# Patient Record
Sex: MALE | Race: WHITE | Employment: OTHER | URBAN - METROPOLITAN AREA
[De-identification: names, ages, dates, MRNs, and addresses within clinical notes are randomized per-mention and may not be internally consistent; named-entity substitution may affect disease eponyms.]

---

## 2018-10-30 ENCOUNTER — HOSPITAL ENCOUNTER (OUTPATIENT)
Dept: SLEEP CENTER | Age: 58
Discharge: HOME | End: 2018-10-30
Payer: COMMERCIAL

## 2018-10-30 DIAGNOSIS — G47.10 HYPERSOMNIA, PERSISTENT: ICD-10-CM

## 2018-10-30 DIAGNOSIS — E66.9 CLASS 2 OBESITY WITH BODY MASS INDEX (BMI) OF 37.0 TO 37.9 IN ADULT, UNSPECIFIED OBESITY TYPE, UNSPECIFIED WHETHER SERIOUS COMORBIDITY PRESENT: Primary | ICD-10-CM

## 2018-10-30 DIAGNOSIS — G47.10 HYPERSOMNOLENCE: ICD-10-CM

## 2018-10-30 DIAGNOSIS — D86.0 SARCOIDOSIS OF LUNG (HCC): ICD-10-CM

## 2018-10-30 DIAGNOSIS — G47.33 OSA TREATED WITH BIPAP: Chronic | ICD-10-CM

## 2018-10-30 PROCEDURE — 95805 MULTIPLE SLEEP LATENCY TEST: CPT

## 2018-10-30 PROCEDURE — 95810 POLYSOM 6/> YRS 4/> PARAM: CPT

## 2018-10-30 ASSESSMENT — SLEEP AND FATIGUE QUESTIONNAIRES
HOW LIKELY ARE YOU TO NOD OFF OR FALL ASLEEP WHILE SITTING AND READING: 1
HOW LIKELY ARE YOU TO NOD OFF OR FALL ASLEEP WHILE SITTING QUIETLY AFTER LUNCH WITHOUT ALCOHOL: 0
HOW LIKELY ARE YOU TO NOD OFF OR FALL ASLEEP WHILE SITTING AND TALKING TO SOMEONE: 0
HOW LIKELY ARE YOU TO NOD OFF OR FALL ASLEEP IN A CAR, WHILE STOPPED FOR A FEW MINUTES IN TRAFFIC: 0
HOW LIKELY ARE YOU TO NOD OFF OR FALL ASLEEP WHEN YOU ARE A PASSENGER IN A CAR FOR AN HOUR WITHOUT A BREAK: 1
ESS TOTAL SCORE: 7
HOW LIKELY ARE YOU TO NOD OFF OR FALL ASLEEP WHILE LYING DOWN TO REST IN THE AFTERNOON WHEN CIRCUMSTANCES PERMIT: 2
HOW LIKELY ARE YOU TO NOD OFF OR FALL ASLEEP WHILE SITTING INACTIVE IN A PUBLIC PLACE: 1
HOW LIKELY ARE YOU TO NOD OFF OR FALL ASLEEP WHILE WATCHING TV: 2

## 2018-10-31 VITALS
SYSTOLIC BLOOD PRESSURE: 140 MMHG | HEART RATE: 75 BPM | WEIGHT: 260 LBS | BODY MASS INDEX: 35.21 KG/M2 | OXYGEN SATURATION: 97 % | HEIGHT: 72 IN | DIASTOLIC BLOOD PRESSURE: 80 MMHG

## 2018-11-05 NOTE — PROGRESS NOTES
[address] [address]                               SLEEP STUDY REPORT    PATIENT NAME: Nicole Patient                       :        1960  MED REC NO:   [id number]                            ROOM:  ACCOUNT NO:   [de-identified]                           ADMIT DATE: 10/30/2018  PROVIDER:     Ayad Robert MD    DATE OF STUDY:  10/30/2018    REFERRING PROVIDER:  Desiree Carrero M.D.    STUDY PERFORMED:  Polysomnography. INDICATION FOR POLYSOMNOGRAPHY:  Study to be done prior to a multiple  sleep latency test.    CURRENT MEDICATIONS:  Rosuvastatin, thyroxine, metformin, vitamins, and  Pepcid. INTERPRETATION:  SLEEP ARCHITECTURE:  This patient had a total time in bed of 451  minutes. Total sleep time was 395 minutes. Sleep efficiency was 88%. Sleep latency was 13 minutes. REM latency was 57 minutes. SLEEP STAGING:  The patient was awake 12% of the time in bed. Stage N1  was 11% and N2 was 49% of the total sleep time. Slow wave sleep was 18%  of the sleep time and end-stage REM sleep was 23% of the sleep time. RESPIRATION SUMMARY:  APNEA:  There were 21 apneic events, 11 central, seven obstructive, and  three mixed. Maximum duration was 27 seconds. Twelve events occurred  during REM stage sleep. HYPOPNEA:  There were four hypopneic events, two occurred during REM  stage sleep. Maximum duration was 36 seconds. APNEA/HYPOPNEA INDEX:  The apnea/hypopnea index was four. Twenty five  events occurred in the supine position. TITRATION OF CPAP:  This patient had a CPAP of seven for the entire  study. There was no titration done. AROUSAL ANALYSIS:  During the study, there were 44 arousals/awakenings,  the sleep disruption index was normal.    LIMB MOVEMENT SUMMARY:  There were 50 limb movements, the limb movement  index was normal.    OXYGEN SATURATION:  Baseline oxygen saturation while awake was 95%.

## 2021-10-05 ENCOUNTER — HOSPITAL ENCOUNTER (OUTPATIENT)
Dept: CARDIOLOGY | Age: 61
Discharge: HOME | End: 2021-10-05
Payer: COMMERCIAL

## 2021-10-05 VITALS
WEIGHT: 308 LBS | HEIGHT: 72 IN | BODY MASS INDEX: 41.72 KG/M2 | DIASTOLIC BLOOD PRESSURE: 70 MMHG | RESPIRATION RATE: 12 BRPM | SYSTOLIC BLOOD PRESSURE: 116 MMHG | HEART RATE: 66 BPM

## 2021-10-05 DIAGNOSIS — R06.02 SHORTNESS OF BREATH: ICD-10-CM

## 2021-10-05 PROCEDURE — 93017 CV STRESS TEST TRACING ONLY: CPT

## 2021-10-05 NOTE — PROCEDURES
[address] [facility] [address]  [phone]    Exercise Stress Study (non-nuclear)      Name: [facility] Account Number:  [de-identified]      :  1960          Sex: male         Date of Study:  10/5/2021    Height: 6' (182.9 cm)          Weight: (!) 308 lb (139.7 kg)    Ordering Provider: Dinah Peabody, MD, Reena Peterson MD          PCP: Cecilia Gastelum MD    Cardiologist: None              Interpreting Physician: Jose Perez MD    Indication:   Detecting the presence and location of coronary artery disease    Clinical History:   Patient has no known history of coronary artery disease. Resting ECG:    NSR 69 bpm. Normal axis/intervals. No ST/T changes. Exercise: The patient exercised using a Jose protocol, completing 5:00 minutes and reaching an estimated work load of 7.0 metabolic equivalents (METS). Resting HR was 69. Peak exercise heart rate was 150 ( 94% of maximum predicted heart rate for age). Baseline /70. Peak exercise /70. The blood pressure response to exercise was normal      Exercise was terminated due to dyspnea. The patient experienced non-cardiac chest pain at rest, no change with exercise. Pulse oximetry was used to monitor oxygen saturation during the stress test.  The study was performed on Room Air. The resting pulse oximeter was 96%. The lowest O2 saturation seen during exercise was 92 %. The average O2 saturation with exercise was 93 %. Exercise ECG:   The patient demonstrated no arrhythmias during exercise. With exercise up to 1.4 mm of horizontal and downsloping ST depression was noted in leads II, III and AVF with initial changes beginning at 2 minutes 48 seconds into exercise, at a heart rate of 128. These changes these changes persisted 2 minutes into recovery period.     Herrera treadmill score was -6 implying intermediate

## 2021-10-06 ENCOUNTER — TELEPHONE (OUTPATIENT)
Dept: CARDIOLOGY CLINIC | Age: 61
End: 2021-10-06

## 2021-10-06 NOTE — TELEPHONE ENCOUNTER
Dr. Sarah Cervantes called to discuss patient's abnormal stress with Dr. Arlette Ramirez.  Stress test was ordered by Dr. Sarah Cervantes, completed by Dr. Hermelindo Metcalf, PCP is Dr. Richie Lizarraga. Spoke with Alphonse Gutiérrez in Dr. Evangelina Calle office, referral has already been placed to [facility].

## 2021-10-13 ENCOUNTER — OFFICE VISIT (OUTPATIENT)
Dept: CARDIOLOGY CLINIC | Age: 61
End: 2021-10-13
Payer: COMMERCIAL

## 2021-10-13 VITALS
HEIGHT: 72 IN | HEART RATE: 75 BPM | BODY MASS INDEX: 41.11 KG/M2 | RESPIRATION RATE: 16 BRPM | DIASTOLIC BLOOD PRESSURE: 78 MMHG | SYSTOLIC BLOOD PRESSURE: 138 MMHG | WEIGHT: 303.5 LBS

## 2021-10-13 DIAGNOSIS — R07.2 PRECORDIAL PAIN: ICD-10-CM

## 2021-10-13 DIAGNOSIS — R94.39 ABNORMAL STRESS TEST: Primary | ICD-10-CM

## 2021-10-13 DIAGNOSIS — R06.02 SHORTNESS OF BREATH: ICD-10-CM

## 2021-10-13 PROCEDURE — 93000 ELECTROCARDIOGRAM COMPLETE: CPT | Performed by: INTERNAL MEDICINE

## 2021-10-13 PROCEDURE — 99204 OFFICE O/P NEW MOD 45 MIN: CPT | Performed by: INTERNAL MEDICINE

## 2021-10-13 NOTE — PROGRESS NOTES
Patient Active Problem List   Diagnosis    SANJAY treated with BiPAP    Sarcoidosis of lung (HCC)    SOB (shortness of breath)       Current Outpatient Medications   Medication Sig Dispense Refill    Famotidine (PEPCID PO) Take 1 tablet by mouth daily      Ascorbic Acid (VITAMIN C) 1000 MG tablet Take 1,000 mg by mouth daily       valACYclovir (VALTREX) 1 g tablet Take 1 g by mouth as needed      Cholecalciferol (VITAMIN D) 2000 units CAPS capsule Take 1 capsule by mouth daily       aspirin 81 MG tablet Take 81 mg by mouth daily      rosuvastatin (CRESTOR) 5 MG tablet 5 mg daily       levothyroxine (SYNTHROID) 150 MCG tablet Take 150 mcg by mouth Daily      metFORMIN (GLUCOPHAGE) 1000 MG tablet Take 1,000 mg by mouth 2 times daily (with meals)       No current facility-administered medications for this visit. CC:    Patient is seen in Initial Evaluation for:  1. Abnormal stress test    2. Shortness of breath    3. Precordial pain        HPI:  Patient is seen in evaluation after abnormal stress test.  Patient with evidence for ischemia by EKG criteria. Patient relates onset of chest pain and shortness of breath over the past month. Describes this as a chest tightness. Relates this is becoming more significant over the past month. Notes on recent trip to [facility] that he had to stop walking frequently to to the above symptoms.     ROS:   General: No unusual weight gain, (+) change in exercise tolerance  Skin: No rash or itching  EENT: No vision changes or nosebleeds  Cardiovascular: No orthopnea or paroxysmal nocturnal dyspnea  Respiratory: No cough or hemoptysis  Gastrointestinal: No hematemesis or recent changes in bowel habits  Genitourinary: No hematuria, urgency or frequency  Musculoskeletal: No muscular weakness or joint swelling   Neurologic / Psychiatric: No incoordination or convulsions  Allergic / Immunologic/ Lymphatic / Endocrine: No anemia or bleeding tendency    Social History Socioeconomic History    Marital status:      Spouse name: Not on file    Number of children: Not on file    Years of education: Not on file    Highest education level: Not on file   Occupational History    Occupation:  self employed   Tobacco Use    Smoking status: Never Smoker    Smokeless tobacco: Never Used   Vaping Use    Vaping Use: Never used   Substance and Sexual Activity    Alcohol use: Yes     Alcohol/week: 0.0 standard drinks     Comment: socially    Drug use: No    Sexual activity: Not Currently     Partners: Female   Other Topics Concern    Not on file   Social History Narrative    Not on file     Social Determinants of Health     Financial Resource Strain:     Difficulty of Paying Living Expenses:    Food Insecurity:     Worried About Running Out of Food in the Last Year:     [address] in the Last Year:    Transportation Needs:     Lack of Transportation (Medical):      Lack of Transportation (Non-Medical):    Physical Activity:     Days of Exercise per Week:     Minutes of Exercise per Session:    Stress:     Feeling of Stress :    Social Connections:     Frequency of Communication with Friends and Family:     Frequency of Social Gatherings with Friends and Family:     Attends [employer] Services:     Active Member of Clubs or Organizations:     Attends Club or Organization Meetings:     Marital Status:    Intimate Partner Violence:     Fear of Current or Ex-Partner:     Emotionally Abused:     Physically Abused:     Sexually Abused:        Family History   Problem Relation Age of Onset    Cancer Mother         lung    Cancer Father         colon    Cancer Maternal Grandmother         lung       Past Medical History:   Diagnosis Date    Hyperlipidemia     Hyperthyroidism     Sarcoidosis     Unspecified sleep apnea        PHYSICAL EXAM:  CONSTITUTIONAL:  Well developed, well nourished    Vitals:    10/13/21 1427 10/13/21 1436   BP: (!) 144/80 138/78   Pulse: 75    Resp: 16    Weight: (!) 303 lb 8 oz (137.7 kg)    Height: 6' (1.829 m)      HEAD & FACE: Normocephalic. Symmetric. EYES: No xanthelasma. Conjunctivae not injected. EARS, NOSE, MOUTH & THROAT: Good dentition. No oral pallor or cyanosis. NECK: No JVD at 30 degrees. No thyromegaly. RESPIRATORY: Clear to auscultation and percussion in all fields. No use of accessory muscle or intercostal retractions. CARDIOVASCULAR: Regular rate and rhythm. No lifts or thrills on palpitation. Auscultation with normal S1-S2 in intensity and splitting. No carotid bruits. Abdominal aorta not enlarged. Femoral arteries without bruits. Pedal pulses 2+. No edema. ABDOMEN: Soft without hepatic or splenic enlargement. No tenderness. MUSCULOSKELETAL: No kyphosis or scoliosis of the back. Good muscle strength and tone. No muscle atrophy. Normal gait and ability to undergo exercise stress testing. EXTREMITIES: No clubbing or cyanosis. SKIN: No Xanthomas or ulcerations. NEUROLOGIC: Oriented to time, place and person. Normal mood and affect. LYMPHATIC:  No palpable neck or supraclavicular nodes. No splenomegaly. EKG: the EKG tracing was reviewed and found to reveal: Normal sinus rhythm.  ms. ASSESSMENT:                                                     ORDERS:       Diagnosis Orders   1. Abnormal stress test  EKG 12 lead    Left heart cath   2. Shortness of breath     3. Precordial pain       Recent onset of shortness of breath and chest pain with exertion/abnormal stress test.    PLAN:   See above orders. Medication reconciliation completed. Old records were reviewed and found to reveal: LDL 93  Had discussion with patient and wife regarding stress test implications and findings. After thorough discussion joint decision to proceed with cardiac catheterization possible PCI. Recent procedures discussed in detail.   Discussed issues that would prompt earlier evaluation/ER. Same cardiac medications. Follow-up office visit in 1 months.

## 2021-10-14 DIAGNOSIS — R06.02 SOB (SHORTNESS OF BREATH): ICD-10-CM

## 2021-10-14 DIAGNOSIS — Z01.810 PREOPERATIVE CARDIOVASCULAR EXAMINATION: Primary | ICD-10-CM

## 2021-10-14 DIAGNOSIS — R94.39 ABNORMAL STRESS TEST: ICD-10-CM

## 2021-10-26 ENCOUNTER — HOSPITAL ENCOUNTER (OUTPATIENT)
Age: 61
Discharge: HOME | End: 2021-10-26
Payer: COMMERCIAL

## 2021-10-26 ENCOUNTER — HOSPITAL ENCOUNTER (OUTPATIENT)
Dept: GENERAL RADIOLOGY | Age: 61
Discharge: HOME | End: 2021-10-28
Payer: COMMERCIAL

## 2021-10-26 ENCOUNTER — HOSPITAL ENCOUNTER (OUTPATIENT)
Age: 61
Discharge: HOME | End: 2021-10-28
Payer: COMMERCIAL

## 2021-10-26 DIAGNOSIS — R94.39 ABNORMAL STRESS TEST: ICD-10-CM

## 2021-10-26 DIAGNOSIS — R06.02 SOB (SHORTNESS OF BREATH): ICD-10-CM

## 2021-10-26 DIAGNOSIS — R06.02 SHORTNESS OF BREATH: ICD-10-CM

## 2021-10-26 DIAGNOSIS — Z01.810 PREOPERATIVE CARDIOVASCULAR EXAMINATION: ICD-10-CM

## 2021-10-26 LAB
ALBUMIN SERPL-MCNC: 4 G/DL (ref 3.5–5.2)
ALP BLD-CCNC: 85 U/L (ref 40–129)
ALT SERPL-CCNC: 25 U/L (ref 0–40)
ANION GAP SERPL CALCULATED.3IONS-SCNC: 10 MMOL/L (ref 7–16)
AST SERPL-CCNC: 20 U/L (ref 0–39)
BILIRUB SERPL-MCNC: 0.4 MG/DL (ref 0–1.2)
BUN BLDV-MCNC: 15 MG/DL (ref 6–23)
CALCIUM SERPL-MCNC: 9.3 MG/DL (ref 8.6–10.2)
CHLORIDE BLD-SCNC: 105 MMOL/L (ref 98–107)
CO2: 23 MMOL/L (ref 22–29)
CREAT SERPL-MCNC: 0.9 MG/DL (ref 0.7–1.2)
GFR AFRICAN AMERICAN: >60
GFR NON-AFRICAN AMERICAN: >60 ML/MIN/1.73
GLUCOSE BLD-MCNC: 82 MG/DL (ref 74–99)
HCT VFR BLD CALC: 41.8 % (ref 37–54)
HEMOGLOBIN: 13.7 G/DL (ref 12.5–16.5)
INR BLD: 0.9
MCH RBC QN AUTO: 28.3 PG (ref 26–35)
MCHC RBC AUTO-ENTMCNC: 32.8 % (ref 32–34.5)
MCV RBC AUTO: 86.4 FL (ref 80–99.9)
PDW BLD-RTO: 13.7 FL (ref 11.5–15)
PLATELET # BLD: 268 E9/L (ref 130–450)
PMV BLD AUTO: 9.7 FL (ref 7–12)
POTASSIUM SERPL-SCNC: 4.1 MMOL/L (ref 3.5–5)
PROTHROMBIN TIME: 11 SEC (ref 9.3–12.4)
RBC # BLD: 4.84 E12/L (ref 3.8–5.8)
SODIUM BLD-SCNC: 138 MMOL/L (ref 132–146)
TOTAL PROTEIN: 6.9 G/DL (ref 6.4–8.3)
WBC # BLD: 6.7 E9/L (ref 4.5–11.5)

## 2021-10-26 PROCEDURE — 71046 X-RAY EXAM CHEST 2 VIEWS: CPT

## 2021-10-26 PROCEDURE — 80053 COMPREHEN METABOLIC PANEL: CPT

## 2021-10-26 PROCEDURE — 36415 COLL VENOUS BLD VENIPUNCTURE: CPT

## 2021-10-26 PROCEDURE — 85610 PROTHROMBIN TIME: CPT

## 2021-10-26 PROCEDURE — 85027 COMPLETE CBC AUTOMATED: CPT

## 2021-10-27 ENCOUNTER — TELEPHONE (OUTPATIENT)
Dept: CARDIAC CATH/INVASIVE PROCEDURES | Age: 61
End: 2021-10-27

## 2021-10-27 NOTE — TELEPHONE ENCOUNTER
Reminded patient of scheduled procedure on 10/28/21. Instructions given and COVID questionnaire completed.

## 2021-10-28 ENCOUNTER — HOSPITAL ENCOUNTER (OUTPATIENT)
Dept: CARDIAC CATH/INVASIVE PROCEDURES | Age: 61
Discharge: HOME | End: 2021-10-28
Attending: INTERNAL MEDICINE | Admitting: INTERNAL MEDICINE
Payer: COMMERCIAL

## 2021-10-28 VITALS
TEMPERATURE: 97.9 F | SYSTOLIC BLOOD PRESSURE: 111 MMHG | HEIGHT: 72 IN | RESPIRATION RATE: 16 BRPM | WEIGHT: 295 LBS | DIASTOLIC BLOOD PRESSURE: 57 MMHG | OXYGEN SATURATION: 97 % | HEART RATE: 82 BPM | BODY MASS INDEX: 39.96 KG/M2

## 2021-10-28 DIAGNOSIS — I25.10 CAD IN NATIVE ARTERY: ICD-10-CM

## 2021-10-28 LAB
ABO/RH: NORMAL
ANTIBODY SCREEN: NORMAL

## 2021-10-28 PROCEDURE — 2709999900 HC NON-CHARGEABLE SUPPLY

## 2021-10-28 PROCEDURE — 86850 RBC ANTIBODY SCREEN: CPT

## 2021-10-28 PROCEDURE — 36415 COLL VENOUS BLD VENIPUNCTURE: CPT

## 2021-10-28 PROCEDURE — 86901 BLOOD TYPING SEROLOGIC RH(D): CPT

## 2021-10-28 PROCEDURE — 93458 L HRT ARTERY/VENTRICLE ANGIO: CPT | Performed by: INTERNAL MEDICINE

## 2021-10-28 PROCEDURE — 2580000003 HC RX 258: Performed by: INTERNAL MEDICINE

## 2021-10-28 PROCEDURE — C1894 INTRO/SHEATH, NON-LASER: HCPCS

## 2021-10-28 PROCEDURE — 2500000003 HC RX 250 WO HCPCS

## 2021-10-28 PROCEDURE — 6360000002 HC RX W HCPCS

## 2021-10-28 PROCEDURE — C1769 GUIDE WIRE: HCPCS

## 2021-10-28 PROCEDURE — 86900 BLOOD TYPING SEROLOGIC ABO: CPT

## 2021-10-28 RX ORDER — SODIUM CHLORIDE 9 MG/ML
25 INJECTION, SOLUTION INTRAVENOUS PRN
Status: DISCONTINUED | OUTPATIENT
Start: 2021-10-28 | End: 2021-10-28 | Stop reason: HOSPADM

## 2021-10-28 RX ORDER — SODIUM CHLORIDE 9 MG/ML
INJECTION, SOLUTION INTRAVENOUS CONTINUOUS
Status: DISCONTINUED | OUTPATIENT
Start: 2021-10-28 | End: 2021-10-28 | Stop reason: HOSPADM

## 2021-10-28 RX ORDER — SODIUM CHLORIDE 0.9 % (FLUSH) 0.9 %
5-40 SYRINGE (ML) INJECTION PRN
Status: DISCONTINUED | OUTPATIENT
Start: 2021-10-28 | End: 2021-10-28 | Stop reason: HOSPADM

## 2021-10-28 RX ORDER — SODIUM CHLORIDE 0.9 % (FLUSH) 0.9 %
5-40 SYRINGE (ML) INJECTION EVERY 12 HOURS SCHEDULED
Status: DISCONTINUED | OUTPATIENT
Start: 2021-10-28 | End: 2021-10-28 | Stop reason: HOSPADM

## 2021-10-28 RX ADMIN — SODIUM CHLORIDE: 9 INJECTION, SOLUTION INTRAVENOUS at 09:10

## 2021-10-28 NOTE — PROGRESS NOTES
Right radial puncture site soft and no bleeding or hematoma. Denies pain.    Extended Stay Recovery Discharge Documentation

## 2021-10-28 NOTE — PROCEDURES
[address]                  [address] [place] [facility]                            CARDIAC CATHETERIZATION    PATIENT NAME: Kerri Nagy                       :        1960  MED REC NO:   [id number]                            ROOM:       6314  ACCOUNT NO:   [de-identified]                           ADMIT DATE: 10/28/2021  PROVIDER:     Mine Mcdonough MD    DATE OF PROCEDURE:  10/28/2021    PROCEDURE:  Left heart catheterization, selective coronary angiography,  and left ventriculography. The procedure was done through right radial approach using ultrasound  guidance. The patient received intravenous Versed and intravenous fentanyl for  sedation. INDICATION:  Chest discomfort, exertional dyspnea, and an abnormal  stress test.    PRESSURES:  Aorta 97/66 with a mean of 77. Left ventricular systolic pressure 699, left ventricular end-diastolic  pressure is 7. There was no gradient across the aortic valve. CORONARY ANGIOGRAPHY:  Left main:  The left main artery did not appear to have any angiographic  disease. LAD:  The left anterior descending artery had a small around 30% tubular  narrowing in the proximal/mid vessel with mild myocardial bridging at  the site with the rest of the vessel showing no significant angiographic  disease. LCX:  The left circumflex is a large vessel which did not appear to have  any angiographic disease. RCA:  The right coronary artery is a dominant vessel which did not  appear to have any angiographic disease. Of note, the coronary arteries were excessively tortuous. LEFT VENTRICULOGRAPHY:  The left ventricle is normal in size and  contractility with an estimated ejection fraction of 65%. There was no  mitral regurgitation noted. The right radial arterial sheath was removed at the end of the  procedure, and a TR band was applied with adequate hemostasis and with  preservation of pulse.     The patient tolerated the procedure well and left the cardiac  catheterization laboratory in stable condition. CONCLUSIONS:  1. Coronary artery disease. a. Left main, no angiographic disease. b.  LAD, large, tortuous vessel with smooth 30% mid vessel tubular  narrowing with mild myocardial bridging at the site. c.  LCX, large, tortuous vessel with no angiographic disease. d.  RCA, dominant, tortuous vessel with no angiographic disease. 2.  Normal ventricular size and systolic function with an estimated  ejection fraction of 65%. 3.  Normal left ventricular end-diastolic pressure.         Warren Ruvalcaba MD    D: 10/28/2021 9:00:00       T: 10/28/2021 9:02:24     [email or web address]  Job#: [id number]     Doc#: [id number]    CC:

## 2021-10-28 NOTE — PROGRESS NOTES
Extended Stay Recovery Discharge Documentation    Final procedure site check completed, stable for discharge- Yes  Ambulated without issue post recovery completion, gait steady. Peripheral IV sites removed (see IV Flowsheet) and site asymptomatic- Yes  Patient dressed self without assistance. Discharge instructions reviewed with Patient and verbalized understanding. Patient discharged Home with spouse at 1:30 PM  Monitor cleaned and placed back in nurses' station- Yes    Pt left glasses in bedding upon discharge.  Called pt and he said to throw them away because they are \"just cheap cheaters\"

## 2021-10-28 NOTE — OP NOTE
Operative Note      Patient: Gordon Rust  YOB: 1960  MRN: [id number]    Date of Procedure: 10/28/21    Indication:  1. CP. Abnormal stress test  2. AUC score: 9  3. AUC indication: 17    Procedure: Left Heart Catheterization, coronary angiography, left ventriculography    Anesthesia: Versed, Fentanyl  Time sedation was administered: 08:23. I was present in the room when sedation was administered. Procedure end time: 08:37  Time spent with face to face monitoring of moderate sedation: 23 minutes    LHC performed via right radial approach using a 6 F sheath. 2.5mg of diluted Verapamil and 200mcg of nitroglycerine administered through the sheath. 5000 U heparin administered IV. Findings:  Left main: 0%  stenosis  LAD: 20 %  stenosis  Circumflex: 0 %   stenosis  RCA: Dominant. 0 %  stenosis  LV angio: 65-70%  ejection fraction    Hemodynamics:  LV: 105 mmHg. EDP: 7 No gradient across AV. Ao: 97/66 ( 77 ). Sheath removed and TR band applied. There was good hemostasis achieved and the distal pulses were intact.      Complication: None   Estimated blood loss: 10 cc  Contrast use: 80 cc    Post op diagnosis:  Patent coronaries  Normal LV function    PLAN:  Medical therapy / risk factor modification      Electronically signed by Arturo Christian MD on 10/28/2021 at 8:50 AM

## 2021-11-17 ENCOUNTER — OFFICE VISIT (OUTPATIENT)
Dept: CARDIOLOGY CLINIC | Age: 61
End: 2021-11-17
Payer: COMMERCIAL

## 2021-11-17 VITALS
DIASTOLIC BLOOD PRESSURE: 64 MMHG | HEIGHT: 72 IN | HEART RATE: 93 BPM | SYSTOLIC BLOOD PRESSURE: 128 MMHG | WEIGHT: 304.6 LBS | BODY MASS INDEX: 41.26 KG/M2 | OXYGEN SATURATION: 97 % | RESPIRATION RATE: 16 BRPM

## 2021-11-17 DIAGNOSIS — I25.10 CAD IN NATIVE ARTERY: Primary | ICD-10-CM

## 2021-11-17 DIAGNOSIS — E78.00 HYPERCHOLESTEROLEMIA: ICD-10-CM

## 2021-11-17 DIAGNOSIS — R06.09 DYSPNEA ON EXERTION: ICD-10-CM

## 2021-11-17 PROCEDURE — 93000 ELECTROCARDIOGRAM COMPLETE: CPT | Performed by: INTERNAL MEDICINE

## 2021-11-17 PROCEDURE — 99214 OFFICE O/P EST MOD 30 MIN: CPT | Performed by: INTERNAL MEDICINE

## 2021-11-17 RX ORDER — ROSUVASTATIN CALCIUM 10 MG/1
10 TABLET, COATED ORAL DAILY
Qty: 90 TABLET | Refills: 3 | Status: ON HOLD
Start: 2021-11-17 | End: 2022-10-19 | Stop reason: HOSPADM

## 2021-11-17 RX ORDER — ROSUVASTATIN CALCIUM 10 MG/1
10 TABLET, COATED ORAL DAILY
COMMUNITY
End: 2021-11-17 | Stop reason: SDUPTHER

## 2021-11-17 NOTE — PROGRESS NOTES
Patient Active Problem List   Diagnosis    SANJAY treated with BiPAP    Sarcoidosis of lung ([facility] [address]    SOB (shortness of breath)    CAD in native artery       Current Outpatient Medications   Medication Sig Dispense Refill    rosuvastatin (CRESTOR) 10 MG tablet Take 1 tablet by mouth daily 90 tablet 3    Famotidine (PEPCID PO) Take 1 tablet by mouth daily      Ascorbic Acid (VITAMIN C) 1000 MG tablet Take 1,000 mg by mouth daily       valACYclovir (VALTREX) 1 g tablet Take 1 g by mouth as needed      Cholecalciferol (VITAMIN D) 2000 units CAPS capsule Take 1 capsule by mouth daily       aspirin 81 MG tablet Take 81 mg by mouth daily      levothyroxine (SYNTHROID) 150 MCG tablet Take 150 mcg by mouth Daily      metFORMIN (GLUCOPHAGE) 1000 MG tablet Take 1,000 mg by mouth 2 times daily (with meals)       No current facility-administered medications for this visit. CC:    Patient is seen in follow up for:  1. CAD in native artery - mild    2. Dyspnea on exertion    3. Hypercholesterolemia        HPI:  Patient is seen in follow-up after recent heart catheterization. He was noted to have mild disease of the LAD but no significant stenosis. Additionally he is seen in follow-up for his shortness of breath with exertion. He relates that he was diagnosed with COVID-19 at the time of his initial evaluation. Subsequently his shortness of breath has been resolving.     ROS:   General: No unusual weight gain, no change in exercise tolerance  Skin: No rash or itching  EENT: No vision changes or nosebleeds  Cardiovascular: No orthopnea or paroxysmal nocturnal dyspnea  Respiratory: No cough or hemoptysis  Gastrointestinal: No hematemesis or recent changes in bowel habits  Genitourinary: No hematuria, urgency or frequency  Musculoskeletal: No muscular weakness or joint swelling   Neurologic / Psychiatric: No incoordination or convulsions  Allergic / Immunologic/ Lymphatic / Endocrine: No anemia or bleeding tendency    Social History     Socioeconomic History    Marital status:      Spouse name: Not on file    Number of children: Not on file    Years of education: Not on file    Highest education level: Not on file   Occupational History    Occupation:  self employed   Tobacco Use    Smoking status: Never Smoker    Smokeless tobacco: Never Used   Vaping Use    Vaping Use: Never used   Substance and Sexual Activity    Alcohol use: Yes     Alcohol/week: 0.0 standard drinks     Comment: socially    Drug use: No    Sexual activity: Not Currently     Partners: Female   Other Topics Concern    Not on file   Social History Narrative    Not on file     Social Determinants of Health     Financial Resource Strain:     Difficulty of Paying Living Expenses: Not on file   Food Insecurity:     Worried About Running Out of Food in the Last Year: Not on file    Thom of Food in the Last Year: Not on file   Transportation Needs:     Lack of Transportation (Medical): Not on file    Lack of Transportation (Non-Medical):  Not on file   Physical Activity:     Days of Exercise per Week: Not on file    Minutes of Exercise per Session: Not on file   Stress:     Feeling of Stress : Not on file   Social Connections:     Frequency of Communication with Friends and Family: Not on file    Frequency of Social Gatherings with Friends and Family: Not on file    Attends [employer]ism Services: Not on file    Active Member of [employer] or Organizations: Not on file    Attends Club or Organization Meetings: Not on file    Marital Status: Not on file   Intimate Partner Violence:     Fear of Current or Ex-Partner: Not on file    Emotionally Abused: Not on file    Physically Abused: Not on file    Sexually Abused: Not on file   Housing Stability:     Unable to Pay for Housing in the Last Year: Not on file    Number of [place] in the Last Year: Not on file    Unstable Housing in the Last Year: Not on file stable. PLAN:   See above orders. Medication reconciliation completed. Old records were reviewed and found to reveal: LDL 93 on 9/20/2021  Discussed issues that would prompt earlier evaluation. Increase Crestor to 10 mg daily. Follow-up office visit in 1 year.

## 2022-03-13 ENCOUNTER — APPOINTMENT (OUTPATIENT)
Dept: GENERAL RADIOLOGY | Age: 62
End: 2022-03-13
Payer: COMMERCIAL

## 2022-03-13 ENCOUNTER — HOSPITAL ENCOUNTER (EMERGENCY)
Age: 62
Discharge: HOME | End: 2022-03-13
Payer: COMMERCIAL

## 2022-03-13 VITALS
WEIGHT: 304 LBS | RESPIRATION RATE: 18 BRPM | HEART RATE: 82 BPM | OXYGEN SATURATION: 96 % | DIASTOLIC BLOOD PRESSURE: 81 MMHG | TEMPERATURE: 98.9 F | BODY MASS INDEX: 41.23 KG/M2 | SYSTOLIC BLOOD PRESSURE: 147 MMHG

## 2022-03-13 DIAGNOSIS — J20.9 ACUTE BRONCHITIS, UNSPECIFIED ORGANISM: Primary | ICD-10-CM

## 2022-03-13 PROCEDURE — 99211 OFF/OP EST MAY X REQ PHY/QHP: CPT

## 2022-03-13 PROCEDURE — G0463 HOSPITAL OUTPT CLINIC VISIT: HCPCS

## 2022-03-13 PROCEDURE — 71046 X-RAY EXAM CHEST 2 VIEWS: CPT

## 2022-03-13 RX ORDER — PREDNISONE 10 MG/1
TABLET ORAL
Qty: 14 TABLET | Refills: 0 | Status: SHIPPED | OUTPATIENT
Start: 2022-03-13 | End: 2022-04-03 | Stop reason: ALTCHOICE

## 2022-03-13 RX ORDER — DOXYCYCLINE HYCLATE 100 MG
100 TABLET ORAL 2 TIMES DAILY
Qty: 14 TABLET | Refills: 0 | Status: SHIPPED | OUTPATIENT
Start: 2022-03-13 | End: 2022-03-20

## 2022-03-13 RX ORDER — GUAIFENESIN AND DEXTROMETHORPHAN HYDROBROMIDE 1200; 60 MG/1; MG/1
1 TABLET, EXTENDED RELEASE ORAL EVERY 12 HOURS PRN
Qty: 12 TABLET | Refills: 0 | Status: SHIPPED | OUTPATIENT
Start: 2022-03-13 | End: 2022-04-03 | Stop reason: ALTCHOICE

## 2022-03-13 NOTE — ED PROVIDER NOTES
[address] [facility]  Department of Emergency Medicine  UC Encounter Note  3/13/22   12:07 PM EDT      NAME: Florencio Rowe  :  1960  MRN:  [id number]    Chief Complaint: Cough (chills, hard to breathe, he thinks he has pneumonia, chest feels tight, sweats, started yesterday) and Shortness of Breath      This is a 51-year-old male the presents to urgent care complaining of some coughing and some chills. He does state a history of sarcoidosis. He does complain of some shortness of breath with that he does take an inhaler. He does feel some chest tightness and has had some sweats. He denies abdominal pain nausea vomiting diarrhea or urinary symptoms. On first contact patient he appears to be in no acute distress. Review of Systems  Pertinent positives and negatives are stated within HPI, all other systems reviewed and are negative. Physical Exam  Vitals and nursing note reviewed. Constitutional:       Appearance: He is well-developed. HENT:      Head: Normocephalic and atraumatic. Jaw: There is normal jaw occlusion. No trismus. Right Ear: Hearing, tympanic membrane, ear canal and external ear normal.      Left Ear: Hearing, tympanic membrane, ear canal and external ear normal.      Nose: No congestion or rhinorrhea. Right Sinus: No maxillary sinus tenderness or frontal sinus tenderness. Left Sinus: No maxillary sinus tenderness or frontal sinus tenderness. Mouth/Throat:      Lips: Pink. Mouth: Mucous membranes are moist.      Pharynx: Oropharynx is clear. Uvula midline. No uvula swelling. Eyes:      General: Lids are normal.      Conjunctiva/sclera: Conjunctivae normal.      Pupils: Pupils are equal, round, and reactive to light. Cardiovascular:      Rate and Rhythm: Normal rate and regular rhythm. Heart sounds: Normal heart sounds. No murmur heard.       Pulmonary:      Effort: Pulmonary effort is normal.      Breath sounds: visit on 03/13/22. XR CHEST (2 VW)   Final Result   No acute cardiopulmonary abnormality.             ------------------------- NURSING NOTES AND VITALS REVIEWED ---------------------------   The nursing notes within the ED encounter and vital signs as below have been reviewed. BP (!) 147/81   Pulse 82   Temp 98.9 °F (37.2 °C)   Resp 18   Wt (!) 304 lb (137.9 kg)   SpO2 96%   BMI 41.23 kg/m²   Oxygen Saturation Interpretation: Normal      ------------------------------------------ PROGRESS NOTES ------------------------------------------   I have spoken with the patient and discussed todays results, in addition to providing specific details for the plan of care and counseling regarding the diagnosis and prognosis. Their questions are answered at this time and they are agreeable with the plan.      --------------------------------- ADDITIONAL PROVIDER NOTES ---------------------------------     This patient is stable for discharge. I have shared the specific conditions for return, as well as the importance of follow-up. * NOTE: This report was transcribed using voice recognition software. Every effort was made to ensure accuracy; however, inadvertent computerized transcription errors may be present.    --------------------------------- IMPRESSION AND DISPOSITION ---------------------------------    IMPRESSION  1.  Acute bronchitis, unspecified organism        DISPOSITION  Disposition: Discharge to home  Patient condition is good       Ese Seth PA-C  03/13/22 1523

## 2022-03-13 NOTE — DISCHARGE INSTR - COC
Continuity of Care Form    Patient Name: Edgardo Henderson   :  1960  MRN:  [id number]    Admit date:  3/13/2022  Discharge date:  ***    Code Status Order: Prior   Advance Directives:      Admitting Physician:  No admitting provider for patient encounter.   PCP: Mercy Rodas MD    Discharging Nurse: [facility] Unit/Room#:   Discharging Unit Phone Number: ***    Emergency Contact:   Extended Emergency Contact Information  Primary Emergency Contact: Dale S Jossie Hernandez Phone: [phone]  Relation: Spouse    Past Surgical History:  Past Surgical History:   Procedure Laterality Date    CARPAL TUNNEL RELEASE Bilateral     KNEE SURGERY      x2 last 2015       Immunization History:   Immunization History   Administered Date(s) Administered    COVID-19, Pfizer Purple top, DILUTE for use, 12+ yrs, 30mcg/0.3mL dose 2021, 2021    Influenza Vaccine, unspecified formulation 2016    Influenza Virus Vaccine 2016, 2017, 10/26/2018, 2021    Influenza, MDCK, Preservative free 2014    Tdap (Boostrix, Adacel) 2018       Active Problems:  Patient Active Problem List   Diagnosis Code    SANJAY treated with BiPAP G47.33    Sarcoidosis of lung ([facility] [address] D86.0    SOB (shortness of breath) R06.02    CAD in native artery I25.10       Isolation/Infection:   Isolation            No Isolation          Patient Infection Status       None to display            Nurse Assessment:  Last Vital Signs: BP (!) 147/81   Pulse 82   Temp 98.9 °F (37.2 °C)   Resp 18   Wt (!) 304 lb (137.9 kg)   SpO2 96%   BMI 41.23 kg/m²     Last documented pain score (0-10 scale):    Last Weight:   Wt Readings from Last 1 Encounters:   22 (!) 304 lb (137.9 kg)     Mental Status:  {IP PT MENTAL STATUS:}    IV Access:  { JANICE IV ACCESS:717644625}    Nursing Mobility/ADLs:  Walking   {CHP DME OMAL:800529085}  Transfer  {CHP DME HXNH:458852067}  Bathing  {CHP DME RHYP:172892813}  Dressing {CHP DME FFDM:379991718}  Toileting  {CHP DME ISIO:630654622}  Feeding  {CHP DME ABJD:327111938}  Med Admin  {CHP DME OUHB:265056243}  Med Delivery   { JANICE MED Delivery:521584016}    Wound Care Documentation and Therapy:        Elimination:  Continence: Bowel: {YES / XN:78959}  Bladder: {YES / IO:89022}  Urinary Catheter: {Urinary Catheter:252810564}   Colostomy/Ileostomy/Ileal Conduit: {YES / A}       Date of Last BM: ***  No intake or output data in the 24 hours ending 22 1206  No intake/output data recorded.     Safety Concerns:     [address] [employer] Safety Concerns:967618596}    Impairments/Disabilities:      [employer] Impairments/Disabilities:902930598}    Nutrition Therapy:  Current Nutrition Therapy:   [employer] Diet List:540425379}    Routes of Feeding: {[facility] DME Other Feedings:175654450}  Liquids: {Slp liquid thickness:66662}  Daily Fluid Restriction: {CHP DME Yes amt example:443307230}  Last Modified Barium Swallow with Video (Video Swallowing Test): {Done Not Done NJSI:186911638}    Treatments at the Time of Hospital Discharge:   Respiratory Treatments: ***  Oxygen Therapy:  {Therapy; copd oxygen:09028}  Ventilator:    { CC Vent GHBC:255029188}    Rehab Therapies: {THERAPEUTIC INTERVENTION:1727700458}  Weight Bearing Status/Restrictions: [address] [employer]  Weight Bearin}  Other Medical Equipment (for information only, NOT a DME order):  {EQUIPMENT:798464274}  Other Treatments: ***    Patient's personal belongings (please select all that are sent with patient):  {[facility] DME Belongings:281914448}    RN SIGNATURE:  {Esignature:548991129}    CASE MANAGEMENT/SOCIAL WORK SECTION    Inpatient Status Date: ***    Readmission Risk Assessment Score:  Readmission Risk              Risk of Unplanned Readmission:  0           Discharging to Facility/ Agency   Name:   Address:  Phone:  Fax:    Dialysis Facility (if applicable)   Name:  Address:  Dialysis Schedule:  Phone:  Fax:    / signature: {Esignature:318371944}    PHYSICIAN SECTION    Prognosis: {Prognosis:4072242267}    Condition at Discharge: [address] [facility] Patient Condition:671448410}    Rehab Potential (if transferring to Rehab): {Prognosis:8182513539}    Recommended Labs or Other Treatments After Discharge: ***    Physician Certification: I certify the above information and transfer of Jd Booth  is necessary for the continuing treatment of the diagnosis listed and that he requires {Admit to Appropriate Level of Care:27533} for {GREATER/LESS:703720361} 30 days.      Update Admission H&P: {CHP DME Changes in WKPSO:635838129}    PHYSICIAN SIGNATURE:  {Esignature:399939171}

## 2022-04-02 ENCOUNTER — APPOINTMENT (OUTPATIENT)
Dept: CT IMAGING | Age: 62
End: 2022-04-02
Payer: COMMERCIAL

## 2022-04-02 ENCOUNTER — HOSPITAL ENCOUNTER (EMERGENCY)
Age: 62
Discharge: HOME | End: 2022-04-03
Attending: STUDENT IN AN ORGANIZED HEALTH CARE EDUCATION/TRAINING PROGRAM
Payer: COMMERCIAL

## 2022-04-02 DIAGNOSIS — R06.02 SHORTNESS OF BREATH: Primary | ICD-10-CM

## 2022-04-02 LAB
BASOPHILS ABSOLUTE: 0.06 E9/L (ref 0–0.2)
BASOPHILS RELATIVE PERCENT: 0.7 % (ref 0–2)
EOSINOPHILS ABSOLUTE: 1.1 E9/L (ref 0.05–0.5)
EOSINOPHILS RELATIVE PERCENT: 12.7 % (ref 0–6)
HCT VFR BLD CALC: 39.3 % (ref 37–54)
HEMOGLOBIN: 12.9 G/DL (ref 12.5–16.5)
IMMATURE GRANULOCYTES #: 0.03 E9/L
IMMATURE GRANULOCYTES %: 0.3 % (ref 0–5)
INFLUENZA A BY PCR: NOT DETECTED
INFLUENZA B BY PCR: NOT DETECTED
LYMPHOCYTES ABSOLUTE: 2.17 E9/L (ref 1.5–4)
LYMPHOCYTES RELATIVE PERCENT: 25.1 % (ref 20–42)
MCH RBC QN AUTO: 28.4 PG (ref 26–35)
MCHC RBC AUTO-ENTMCNC: 32.8 % (ref 32–34.5)
MCV RBC AUTO: 86.4 FL (ref 80–99.9)
MONOCYTES ABSOLUTE: 0.69 E9/L (ref 0.1–0.95)
MONOCYTES RELATIVE PERCENT: 8 % (ref 2–12)
NEUTROPHILS ABSOLUTE: 4.59 E9/L (ref 1.8–7.3)
NEUTROPHILS RELATIVE PERCENT: 53.2 % (ref 43–80)
PDW BLD-RTO: 14.5 FL (ref 11.5–15)
PLATELET # BLD: 247 E9/L (ref 130–450)
PMV BLD AUTO: 9.5 FL (ref 7–12)
RBC # BLD: 4.55 E12/L (ref 3.8–5.8)
SARS-COV-2, NAAT: NOT DETECTED
WBC # BLD: 8.6 E9/L (ref 4.5–11.5)

## 2022-04-02 PROCEDURE — 83880 ASSAY OF NATRIURETIC PEPTIDE: CPT

## 2022-04-02 PROCEDURE — 84484 ASSAY OF TROPONIN QUANT: CPT

## 2022-04-02 PROCEDURE — 6370000000 HC RX 637 (ALT 250 FOR IP): Performed by: STUDENT IN AN ORGANIZED HEALTH CARE EDUCATION/TRAINING PROGRAM

## 2022-04-02 PROCEDURE — 87635 SARS-COV-2 COVID-19 AMP PRB: CPT

## 2022-04-02 PROCEDURE — 6360000002 HC RX W HCPCS: Performed by: STUDENT IN AN ORGANIZED HEALTH CARE EDUCATION/TRAINING PROGRAM

## 2022-04-02 PROCEDURE — 80053 COMPREHEN METABOLIC PANEL: CPT

## 2022-04-02 PROCEDURE — 94640 AIRWAY INHALATION TREATMENT: CPT

## 2022-04-02 PROCEDURE — 85025 COMPLETE CBC W/AUTO DIFF WBC: CPT

## 2022-04-02 PROCEDURE — 93005 ELECTROCARDIOGRAM TRACING: CPT | Performed by: STUDENT IN AN ORGANIZED HEALTH CARE EDUCATION/TRAINING PROGRAM

## 2022-04-02 PROCEDURE — 99284 EMERGENCY DEPT VISIT MOD MDM: CPT

## 2022-04-02 PROCEDURE — 87502 INFLUENZA DNA AMP PROBE: CPT

## 2022-04-02 PROCEDURE — 96374 THER/PROPH/DIAG INJ IV PUSH: CPT

## 2022-04-02 RX ORDER — METHYLPREDNISOLONE SODIUM SUCCINATE 125 MG/2ML
125 INJECTION, POWDER, LYOPHILIZED, FOR SOLUTION INTRAMUSCULAR; INTRAVENOUS ONCE
Status: COMPLETED | OUTPATIENT
Start: 2022-04-02 | End: 2022-04-02

## 2022-04-02 RX ORDER — IPRATROPIUM BROMIDE AND ALBUTEROL SULFATE 2.5; .5 MG/3ML; MG/3ML
1 SOLUTION RESPIRATORY (INHALATION)
Status: COMPLETED | OUTPATIENT
Start: 2022-04-02 | End: 2022-04-02

## 2022-04-02 RX ADMIN — IPRATROPIUM BROMIDE AND ALBUTEROL SULFATE 1 AMPULE: .5; 3 SOLUTION RESPIRATORY (INHALATION) at 23:05

## 2022-04-02 RX ADMIN — IPRATROPIUM BROMIDE AND ALBUTEROL SULFATE 1 AMPULE: .5; 3 SOLUTION RESPIRATORY (INHALATION) at 23:25

## 2022-04-02 RX ADMIN — IPRATROPIUM BROMIDE AND ALBUTEROL SULFATE 1 AMPULE: .5; 3 SOLUTION RESPIRATORY (INHALATION) at 23:28

## 2022-04-02 RX ADMIN — METHYLPREDNISOLONE SODIUM SUCCINATE 125 MG: 125 INJECTION, POWDER, FOR SOLUTION INTRAMUSCULAR; INTRAVENOUS at 23:05

## 2022-04-03 ENCOUNTER — APPOINTMENT (OUTPATIENT)
Dept: CT IMAGING | Age: 62
End: 2022-04-03
Payer: COMMERCIAL

## 2022-04-03 VITALS
DIASTOLIC BLOOD PRESSURE: 70 MMHG | TEMPERATURE: 97.2 F | WEIGHT: 305 LBS | RESPIRATION RATE: 18 BRPM | OXYGEN SATURATION: 98 % | HEIGHT: 72 IN | HEART RATE: 72 BPM | BODY MASS INDEX: 41.31 KG/M2 | SYSTOLIC BLOOD PRESSURE: 131 MMHG

## 2022-04-03 LAB
ALBUMIN SERPL-MCNC: 4 G/DL (ref 3.5–5.2)
ALP BLD-CCNC: 87 U/L (ref 40–129)
ALT SERPL-CCNC: 16 U/L (ref 0–40)
ANION GAP SERPL CALCULATED.3IONS-SCNC: 10 MMOL/L (ref 7–16)
AST SERPL-CCNC: 17 U/L (ref 0–39)
BILIRUB SERPL-MCNC: 0.4 MG/DL (ref 0–1.2)
BUN BLDV-MCNC: 17 MG/DL (ref 6–23)
CALCIUM SERPL-MCNC: 9.1 MG/DL (ref 8.6–10.2)
CHLORIDE BLD-SCNC: 105 MMOL/L (ref 98–107)
CO2: 26 MMOL/L (ref 22–29)
CREAT SERPL-MCNC: 1.1 MG/DL (ref 0.7–1.2)
EKG ATRIAL RATE: 78 BPM
EKG P AXIS: 62 DEGREES
EKG P-R INTERVAL: 148 MS
EKG Q-T INTERVAL: 394 MS
EKG QRS DURATION: 92 MS
EKG QTC CALCULATION (BAZETT): 449 MS
EKG R AXIS: 62 DEGREES
EKG T AXIS: 36 DEGREES
EKG VENTRICULAR RATE: 78 BPM
GFR AFRICAN AMERICAN: >60
GFR NON-AFRICAN AMERICAN: >60 ML/MIN/1.73
GLUCOSE BLD-MCNC: 102 MG/DL (ref 74–99)
POTASSIUM REFLEX MAGNESIUM: 3.9 MMOL/L (ref 3.5–5)
PRO-BNP: 28 PG/ML (ref 0–125)
SODIUM BLD-SCNC: 141 MMOL/L (ref 132–146)
TOTAL PROTEIN: 6.6 G/DL (ref 6.4–8.3)
TROPONIN, HIGH SENSITIVITY: <6 NG/L (ref 0–11)

## 2022-04-03 PROCEDURE — 71275 CT ANGIOGRAPHY CHEST: CPT

## 2022-04-03 PROCEDURE — 93010 ELECTROCARDIOGRAM REPORT: CPT | Performed by: INTERNAL MEDICINE

## 2022-04-03 PROCEDURE — 6360000004 HC RX CONTRAST MEDICATION: Performed by: RADIOLOGY

## 2022-04-03 RX ORDER — GUAIFENESIN AND DEXTROMETHORPHAN HYDROBROMIDE 1200; 60 MG/1; MG/1
1 TABLET, EXTENDED RELEASE ORAL 2 TIMES DAILY PRN
Qty: 14 TABLET | Refills: 0 | Status: SHIPPED | OUTPATIENT
Start: 2022-04-03 | End: 2022-04-04

## 2022-04-03 RX ORDER — PREDNISONE 20 MG/1
20 TABLET ORAL DAILY
Qty: 5 TABLET | Refills: 0 | Status: SHIPPED | OUTPATIENT
Start: 2022-04-03 | End: 2022-04-04

## 2022-04-03 RX ADMIN — IOPAMIDOL 75 ML: 755 INJECTION, SOLUTION INTRAVENOUS at 01:05

## 2022-04-03 NOTE — ED PROVIDER NOTES
Department of Emergency Medicine   ED  Provider Note  Admit Date/RoomTime: 4/2/2022 10:39 PM  ED Room: 01/01          History of Present Illness:  4/2/22, Time: 10:54 PM EDT  Chief Complaint   Patient presents with    Shortness of Breath     PT stated it started Tuesday night. . Just came back from [facility]. Bobby Scherer is a 58 y.o. male presenting to the ED for shortness of breath, beginning five days ago. The complaint has been persistent, moderate in severity, and worsened by nothing. The patient is a 69-year-old male who presents to the emergency department complaining of shortness of breath. The patient symptoms are sudden onset 5 days ago, has been persistent, moderate severity, nothing makes it better or worse. He states that he has been wheezing and short of breath all week. He states that he was on vacation in [place] and he just came back and feels like he is getting worse. The patient receive the COVID vaccine but not the COVID booster. He states he did not receive the flu shot. He did not take anything for his symptoms prior to arrival.  He states that he is a non-smoker. He states that he has had a cough productive of white sputum at times. He denies any chest pain, lightheadedness, dizziness, syncope, history of blood clots or bleeding disorders, abdominal pain, palpitations, numbness, tingling, unilateral weakness, nausea, vomiting, diarrhea, recent hospitalization, recent illness, recent surgery, or other acute symptoms or concerns. Review of Systems:   A complete review of systems was performed and pertinent positives and negatives are stated within HPI, all other systems reviewed and are negative.        --------------------------------------------- PAST HISTORY ---------------------------------------------  Past Medical History:  has a past medical history of Hyperlipidemia, Hyperthyroidism, Kidney stone, Sarcoidosis, and Unspecified sleep apnea.     Past Surgical History:  has a past surgical history that includes knee surgery and Carpal tunnel release (Bilateral). Social History:  reports that he has never smoked. He has never used smokeless tobacco. He reports current alcohol use. He reports that he does not use drugs. Family History: family history includes Cancer in his father, maternal grandmother, and mother. . Unless otherwise noted, family history is non contributory    The patients home medications have been reviewed. Allergies: Provigil [modafinil]    I have reviewed the past medical history, past surgical history, social history, and family history    ---------------------------------------------------PHYSICAL EXAM--------------------------------------    Constitutional/General: Alert and oriented x3, obese male sitting up in bed resting comfortably in no acute distress  Head: Normocephalic and atraumatic  Eyes:  EOMI, sclera non icteric  ENT: Oropharynx clear, handling secretions, no trismus, no asymmetry of the posterior oropharynx or uvular edema  Neck: Supple, full ROM, no stridor, no meningeal signs  Respiratory: Lungs are diminished bilaterally but there is no respiratory distress. No tachypnea or conversational dyspnea  Cardiovascular:  Regular rate. Regular rhythm. No murmurs, no gallops, no rubs. 2+ distal pulses. Equal extremity pulses. Gastrointestinal:  Abdomen Soft, Non tender, Non distended. No rebound, guarding, or rigidity. No pulsatile masses. Musculoskeletal: Moves all extremities x 4. Warm and well perfused, no clubbing, no cyanosis, no edema. Capillary refill <3 seconds  Skin: skin warm and dry. No rashes. Neurologic: GCS 15, no focal deficits, symmetric strength 5/5 in the upper and lower extremities bilaterally  Psychiatric: Normal Affect    -------------------------------------------------- RESULTS -------------------------------------------------  I have personally reviewed all laboratory and imaging results for this patient. Results are listed below.      LABS: (Lab results interpreted by me)  Results for orders placed or performed during the hospital encounter of 04/02/22   COVID-19, Rapid    Specimen: Nasopharyngeal Swab   Result Value Ref Range    SARS-CoV-2, NAAT Not Detected Not Detected   Rapid influenza A/B antigens    Specimen: Nasopharyngeal   Result Value Ref Range    Influenza A by PCR Not Detected Not Detected    Influenza B by PCR Not Detected Not Detected   CBC with Auto Differential   Result Value Ref Range    WBC 8.6 4.5 - 11.5 E9/L    RBC 4.55 3.80 - 5.80 E12/L    Hemoglobin 12.9 12.5 - 16.5 g/dL    Hematocrit 39.3 37.0 - 54.0 %    MCV 86.4 80.0 - 99.9 fL    MCH 28.4 26.0 - 35.0 pg    MCHC 32.8 32.0 - 34.5 %    RDW 14.5 11.5 - 15.0 fL    Platelets 142 671 - 941 E9/L    MPV 9.5 7.0 - 12.0 fL    Neutrophils % 53.2 43.0 - 80.0 %    Immature Granulocytes % 0.3 0.0 - 5.0 %    Lymphocytes % 25.1 20.0 - 42.0 %    Monocytes % 8.0 2.0 - 12.0 %    Eosinophils % 12.7 (H) 0.0 - 6.0 %    Basophils % 0.7 0.0 - 2.0 %    Neutrophils Absolute 4.59 1.80 - 7.30 E9/L    Immature Granulocytes # 0.03 E9/L    Lymphocytes Absolute 2.17 1.50 - 4.00 E9/L    Monocytes Absolute 0.69 0.10 - 0.95 E9/L    Eosinophils Absolute 1.10 (H) 0.05 - 0.50 E9/L    Basophils Absolute 0.06 0.00 - 0.20 E9/L   Comprehensive Metabolic Panel w/ Reflex to MG   Result Value Ref Range    Sodium 141 132 - 146 mmol/L    Potassium reflex Magnesium 3.9 3.5 - 5.0 mmol/L    Chloride 105 98 - 107 mmol/L    CO2 26 22 - 29 mmol/L    Anion Gap 10 7 - 16 mmol/L    Glucose 102 (H) 74 - 99 mg/dL    BUN 17 6 - 23 mg/dL    CREATININE 1.1 0.7 - 1.2 mg/dL    GFR Non-African American >60 >=60 mL/min/1.73    GFR African American >60     Calcium 9.1 8.6 - 10.2 mg/dL    Total Protein 6.6 6.4 - 8.3 g/dL    Albumin 4.0 3.5 - 5.2 g/dL    Total Bilirubin 0.4 0.0 - 1.2 mg/dL    Alkaline Phosphatase 87 40 - 129 U/L    ALT 16 0 - 40 U/L    AST 17 0 - 39 U/L   Troponin   Result Value Ref Range Troponin, High Sensitivity <6 0 - 11 ng/L   Brain Natriuretic Peptide   Result Value Ref Range    Pro-BNP 28 0 - 125 pg/mL   EKG 12 Lead   Result Value Ref Range    Ventricular Rate 78 BPM    Atrial Rate 78 BPM    P-R Interval 148 ms    QRS Duration 92 ms    Q-T Interval 394 ms    QTc Calculation (Bazett) 449 ms    P Axis 62 degrees    R Axis 62 degrees    T Axis 36 degrees   ,       RADIOLOGY:  Interpreted by Radiologist unless otherwise specified  CTA CHEST W CONTRAST   Final Result   No evidence of pulmonary embolism or acute pulmonary abnormality. EKG Interpretation  Interpreted by emergency department physician, Dr. Meka Benoit    EKG: This EKG is signed and interpreted by me. Rate: 78  Rhythm: Sinus  Interpretation: Normal sinus rhythm, normal axis, no acute elevations or depressions, normals are within normal limits, QTC is 449  Comparison: stable as compared to patient's most recent EKG       ------------------------- NURSING NOTES AND VITALS REVIEWED ---------------------------   The nursing notes within the ED encounter and vital signs as below have been reviewed by myself  /70   Pulse 72   Temp 97.2 °F (36.2 °C) (Temporal)   Resp 18   Ht 6' (1.829 m)   Wt (!) 305 lb (138.3 kg)   SpO2 98%   BMI 41.37 kg/m²     Oxygen Saturation Interpretation: Normal    The patients available past medical records and past encounters were reviewed. ------------------------------ ED COURSE/MEDICAL DECISION MAKING----------------------  Medications   ipratropium-albuterol (DUONEB) nebulizer solution 1 ampule (1 ampule Inhalation Given 4/2/22 2328)   methylPREDNISolone sodium (SOLU-MEDROL) injection 125 mg (125 mg IntraVENous Given 4/2/22 2305)   iopamidol (ISOVUE-370) 76 % injection 75 mL (75 mLs IntraVENous Given 4/3/22 0105)           The cardiac monitor revealed NSR with a heart rate in the 70s as interpreted by me.  The cardiac monitor was ordered secondary to the patient's shortness of breath and to monitor the patient for dysrhythmia. CPT [id number]       IDr. Anish, am the primary provider of record    Medical Decision Making:   The patient is a 30-year-old male presents the emergency department complaining of shortness of breath. He is hemodynamically stable, nontoxic, in no acute distress. EKG is reassuring and does not show any evidence of acute ischemia. High-sensitivity troponin was negative. Labs are otherwise reassuring. Influenza and Covid were also negative. Chest x-ray does not show focal infiltrate or evidence of pneumonia. Discussed with patient that he may have a viral infection causing his symptoms and may have bronchitis. Recommended symptomatic management at home and close follow-up with family doctor. Strict return precautions given. Patient was never hypoxic and remained hemodynamically stable in the emergency department. He verbalized understanding agreement treatment plan and discharge instructions. Oxygen Saturation Interpretation: 98 % on room air. Re-Evaluations:  ED Course as of 04/05/22 2024   Sun Apr 03, 2022   0141 Patient 96% resting comfortably and in no distress. [KG]      ED Course User Index  [KG] Daysi Obrien DO             This patient's ED course included: a personal history and physicial examination, re-evaluation prior to disposition, multiple bedside re-evaluations, IV medications, cardiac monitoring, continuous pulse oximetry and complex medical decision making and emergency management    This patient has remained hemodynamically stable during their ED course. Counseling: The emergency provider has spoken with the patient and discussed todays results, in addition to providing specific details for the plan of care and counseling regarding the diagnosis and prognosis.   Questions are answered at this time and they are agreeable with the plan.       --------------------------------- IMPRESSION AND DISPOSITION ---------------------------------    IMPRESSION  1. Shortness of breath        DISPOSITION  Disposition: Discharge to home  Patient condition is stable        NOTE: This report was transcribed using voice recognition software.  Every effort was made to ensure accuracy; however, inadvertent computerized transcription errors may be present       Tyrese Bolden DO  04/05/22 2024

## 2022-04-29 ENCOUNTER — HOSPITAL ENCOUNTER (OUTPATIENT)
Age: 62
Discharge: HOME | End: 2022-05-01

## 2022-04-29 PROCEDURE — 88305 TISSUE EXAM BY PATHOLOGIST: CPT

## 2022-04-29 PROCEDURE — 87081 CULTURE SCREEN ONLY: CPT

## 2022-04-29 PROCEDURE — 88342 IMHCHEM/IMCYTCHM 1ST ANTB: CPT

## 2022-04-30 LAB — CLOTEST: NORMAL

## 2022-09-02 ENCOUNTER — INITIAL CONSULT (OUTPATIENT)
Dept: BARIATRICS/WEIGHT MGMT | Age: 62
End: 2022-09-02
Payer: COMMERCIAL

## 2022-09-02 VITALS
DIASTOLIC BLOOD PRESSURE: 78 MMHG | HEIGHT: 72 IN | RESPIRATION RATE: 20 BRPM | HEART RATE: 72 BPM | BODY MASS INDEX: 42.66 KG/M2 | TEMPERATURE: 98.6 F | SYSTOLIC BLOOD PRESSURE: 165 MMHG | WEIGHT: 315 LBS

## 2022-09-02 DIAGNOSIS — E66.01 MORBID OBESITY DUE TO EXCESS CALORIES (HCC): ICD-10-CM

## 2022-09-02 DIAGNOSIS — K30 INDIGESTION: Primary | ICD-10-CM

## 2022-09-02 DIAGNOSIS — K21.9 GASTROESOPHAGEAL REFLUX DISEASE WITHOUT ESOPHAGITIS: ICD-10-CM

## 2022-09-02 PROCEDURE — 99202 OFFICE O/P NEW SF 15 MIN: CPT

## 2022-09-02 PROCEDURE — 99204 OFFICE O/P NEW MOD 45 MIN: CPT | Performed by: SURGERY

## 2022-09-02 NOTE — PATIENT INSTRUCTIONS
What is the next step to proceed with weight loss surgery? Please be aware that any co-pays or deductibles may be requested prior to testing and / or procedures. You will need to schedule a psychological evaluation for weight loss surgery. Patients will be required to complete all psychological testing as required by the mental health provider. Patients must also follow all of the provider's recommendations before weight loss surgery can be scheduled. The evaluation must be done a standard way for weight loss surgery. We strongly recommend that you contact one of our preferred providers listed below to arrange this:      Mat Marcos, [facility]  [address] [place], [place]   [phone]    Carmell Apley and [address]   [phone]    Dr. Edelmira Brunson, PhD    Ophelia Gayla[address] [place], [place]    [phone]      You will also need to plan on attending a 2 hour nutrition class at [facility] prior to your surgery. We will schedule this for you when we schedule your surgery. Please remember to have your labs drawn 10 days prior to your first scheduled dietary appointment. Please remember, that while we will submit your case to insurance for surgery authorization, it is your responsibility to know if your plan covers weight loss surgery and keep up-to-date with changes to your insurance coverage. We will do everything possible to help you get approved for weight loss surgery, but cannot guarantee an approval.     Please note that you will not be submitted to your insurance company until all pre-operative testing requirements are met.

## 2022-09-02 NOTE — PROGRESS NOTES
Loretta See  9/2/2022  [facility]    Initial Evaluation  Bariatric Surgery and EGD       Subjective:  CHIEF COMPLAINT: Morbid obesity, malnutrition, Hyperlipidemia, Obstructive Sleep Apnea treated with BiPAP/CPAP, GERD, Degenerative Joint Disease (DJD), and History of Nephrolithiasis    HISTORY OF PRESENT ILLNESS: Loretta See is a morbidly-obese 58 y.o.  male, who weighs (!) 315 lb (142.9 kg). He is 127 pounds over his ideal body weight. The severity is severe with Body mass index is 42.72 kg/m². He has multiple medical problems aggravated by his obesity. They have been overweight since age 15. He wishes to have bariatric surgery so that he can lose a large amount of weight to a goal of their ideal body weight based on height, build and sex, and keep the weight off. I have met with [facility] in the [facility] where we discussed the surgery in great detail and went over the risks and benefits. He has watched our informational video so he understands all of the extensive risks involved. He states that he understands all of the risks and wishes to proceed with the evaluation. We have discussed the different surgical options in detail with use of diagrams and drawings to detail the procedures, risks and alternatives. We discussed the postoperative diet and proposed life long diet for weight management after surgery. Patient has been undergoing medical weight loss management at [facility] with  over the last year. He is completed all of his requirements and is now ready to submit for approval however his insurance company will not approve surgery at a noncertified bariatric center of excellence therefore has been referred to us.     They are a nonsmoker  They have no significant exposure to second hand smoke    Past Medical History  Patient Active Problem List   Diagnosis    SANJAY treated with BiPAP    Sarcoidosis of lung (HCC)    SOB (shortness anesthesia. Review of Systems    A complete 10 system review was performed and are otherwise negative unless mentioned in the above HPI. Specific negatives are listed below but may not include all those reviewed. General ROS: negative obtundation, AMS  ENT ROS: negative rhinorrhea, epistaxis  Allergy and Immunology ROS: negative itchy/watery eyes or nasal congestion  Hematological and Lymphatic ROS: negative spontaneous bleeding or bruising  Endocrine ROS: negative  lethargy, mood swings, palpitations or polydipsia/polyuria  Respiratory ROS: negative sputum changes, stridor, tachypnea or wheezing  Cardiovascular ROS: negative for - loss of consciousness, murmur or orthopnea  Gastrointestinal ROS: negative for - hematochezia or hematemesis  Genito-Urinary ROS: negative for -  genital discharge or hematuria  Musculoskeletal ROS: negative for - focal weakness, gangrene  Psych/Neuro ROS: negative for - visual or auditory hallucinations, suicidal ideation      Physical Exam:   VITALS: BP (!) 165/78 (Site: Left Lower Arm, Position: Sitting, Cuff Size: Large Adult)   Pulse 72   Temp 98.6 °F (37 °C) (Temporal)   Resp 20   Ht 6' (1.829 m)   Wt (!) 315 lb (142.9 kg)   BMI 42.72 kg/m²   General appearance: AAO, NAD  Eyes: PERRL, EOMI, red conjunctiva  Lungs: equal chest rise bilateral, no wheeze, no rhonchi  Chest wall: atraumatic, no tenderness, no echymosis or abrasions  Heart: reg rate, no murmur  Abdomen: soft, nondistended, nontender, obese  Extremities: full ROM all 4 ext, no gross motor or sensory deficits  Pulses: 2+ distal  Skin: warm and dry  Neurologic: spontanous eye opening, purposeful, follows complex commands    Assessment/Plan:  Kaylie Ely is a morbidly-obese 58 y.o. male with failure of medical management, inability to keep the weight off with diet and exercise.  He is interested in Surgical weight loss and specifically we discussed at length the options of Laparoscopic Lisa-en- Y Gastric Bypass or Sleeve Gastrectomy. We discussed that our goal is to ameliorate the medical problems and not to obtain a specific body mass index. He understands the risks and benefits, and wishes to proceed with the evaluation. 1. Pre-op evaluation  EGD with biopsy- Scheduled   - US GALLBLADDER RUQ; Scheduled     2. Morbid obesity due to excess calories ([address]    - medical weight loss management- dietary and nutritional counseling provided for 15min during our conversation with emphasis on protein intake, low calorie, limit late night eating and eating away from the table with other distractions. - Comprehensive Metabolic Panel; reviewed  - CBC; reviewed    - Triglyceride; reviewed  - Cholesterol, Total; reviewed  - Zinc; reviewed  - Vitamin B12; reviewed  - Folate; reviewed  - Vitamin B1; reviewed  - Vitamin D 25 Hydroxy; reviewed 60  - Prealbumin; reviewed  - US GALLBLADDER RUQ; Scheduled    3. Metabolic syndrome diabetes mellitus: Continue metformin we discussed the mechanism of action of the medications he is currently using we also discussed the indications after surgery for discontinuation. 4. Gastroesophageal reflux disease without esophagitis  RUQ US eval gallbladder disease  EGD completed  Continue tums prn        Psych evaluation, medical clearance, right upper quadrant ultrasound to evaluate for fatty liver disease and gallbladder abnormalities. These patients often have vitamin deficiencies so I will do screening labs for malnutrition. I diascopy is already been performed by Dr. Luli Livingston which was reviewed with significant findings of Cameron's esophagus without dysplasia. Patient has previously had a metabolic bariatric surgery and metabolic weight loss management counseling done at [address] [place] [address] did not approve due to the lack of bariatric center excellent certification.   I discussed with the patient reviewed both surgeries laparoscopic sleeve and Lisa-en-Y gastric bypass. Given the patient's history of Cameron's esophagus I did not recommend a sleeve gastrectomy. We discussed the risk benefits and alternatives to Lisa-en-Y gastric bypass malabsorption syndrome following, dumping syndrome at length. We discussed surgical complications and postoperative course and recovery. I have spent over 45min in evaluation of the patient including greater than 50% of that time face to face discussing surgical options, medical and surgical history, plans for medical weight loss management and preoperative clearance for surgery. They did have family present for the discussion and visual aides and drawings were used to explain anatomy and surgical procedures. Right upper quadrant ultrasound is the only remaining piece of information needed prior to surgery. Patient will need to undergo at least 6 to 8 pounds of weight loss prior to his surgery date. I am okay to submit for approval as insurance company as he seems to have already completed a majority of the required materials dietary nutritional counseling and education regarding the procedures.   Okay to schedule once approved by insurance for Lisa-en-Y gastric bypass    Physician Signature: Electronically signed by Dr. Placido Lopez MD    Send copy of H&P to PCP, Xiomara Harris MD

## 2022-09-06 ENCOUNTER — HOSPITAL ENCOUNTER (OUTPATIENT)
Dept: ULTRASOUND IMAGING | Age: 62
Discharge: HOME | End: 2022-09-08
Payer: COMMERCIAL

## 2022-09-06 DIAGNOSIS — K30 INDIGESTION: ICD-10-CM

## 2022-09-06 PROCEDURE — 76705 ECHO EXAM OF ABDOMEN: CPT

## 2022-09-07 ENCOUNTER — TELEPHONE (OUTPATIENT)
Dept: BARIATRICS/WEIGHT MGMT | Age: 62
End: 2022-09-07

## 2022-09-07 ENCOUNTER — INITIAL CONSULT (OUTPATIENT)
Dept: BARIATRICS/WEIGHT MGMT | Age: 62
End: 2022-09-07

## 2022-09-07 VITALS — WEIGHT: 315 LBS | HEIGHT: 72 IN | BODY MASS INDEX: 42.66 KG/M2

## 2022-09-07 DIAGNOSIS — Z00.8 NUTRITIONAL ASSESSMENT: Primary | ICD-10-CM

## 2022-09-07 DIAGNOSIS — Z71.3 NUTRITIONAL COUNSELING: ICD-10-CM

## 2022-09-07 PROCEDURE — 99999 PR OFFICE/OUTPT VISIT,PROCEDURE ONLY: CPT | Performed by: DIETITIAN, REGISTERED

## 2022-09-07 NOTE — PATIENT INSTRUCTIONS
[address] [facility] and [facility]  Dietary Initial Appointment Patient Instructions    By: Dionicio Vigil RD / JIMBO  [phone]      At your initial dietary appointment you have received the following information packets:    Please be aware at each visit you have been instructed that in order for your insurance company to approve your surgery you must show a consistent weight loss of 2 lbs or greater at each visit. We can not guarantee an approval by your insurance company we can only provide the information given to us it is up to you the patient to show compliancy to your insurance company. If you do gain weight during your supervised weight loss counseling sessions insurance companies starting in 2018 are denying patients for not showing consistent weight loss results when part of a supervised weight loss counseling program. Pt was instructed on 9/7/22. Pt verbalized understanding. Low-Fat Diet  - Please be sure to begin your low-fat diet from today's date until your scheduled surgery date. If you are not at goal weight by your history and physical appointment with your surgeon your surgery will be cancelled. Goal Weight: 303 lbs BMI: 41.0 - Pt instructed to not go below this amount until insurance approval.  Low-Fat Diet Contract - Patient Acknowledge and Signed  Supplement List - Please be sure to be saving to purchase your 3 month supply of supplements. If you do not bring supplements to your history and physical appointment your surgery will be cancelled. Supplement Contract - Patient Acknowledge and Signed  Please be sure to take a daily Multi-vitamin from now until surgery to reduce your incidence of infection. If your insurance company requires additional dietary counseling you will be scheduled to see the dietitian the following month.    If your psychological evaluation is completed and all your dietary requirements for your individual insurance company have been completed you will be submitted to insurance. Please make sure to check with us to see if we have received your psychological evaluation. Please be aware that any co-pays or deductibles may be requested prior to testing and / or procedures. You will need to schedule a psychological evaluation for weight loss surgery. Patients will be required to complete all psychological testing as required by the psychologist. Patients must follow all of the psychologist's recommendations before weight loss surgery can be scheduled. The evaluation must be done a standard way for weight loss surgery. We strongly recommend that you contact one of our preferred providers listed below to arrange this:    Leanne Cintron, [address] [facility]                                                              [address] [place] [address]                                                                                      [phone]     [facility] and Brandon Lloyd [address] [place] [address]                                                                                                [phone]        Dr. Kirill Velez, PhD                         [facility] [place], [place]                                                                                              [phone]     You will also need to plan on attending a 2 hour nutrition class at the [facility] prior to your surgery. We will schedule this for you when we schedule your surgery. Please remember to have your labs drawn prior to your scheduled appointment to review the results of your testing. Please remember, that while we will submit your case to insurance for surgery authorization, it is your responsibility to know if your plan covers weight loss surgery and keep up-to-date with changes to your insurance coverage.   We will do everything possible to help you get approved for weight loss surgery, but cannot guarantee an approval.      Please note that you will not be submitted to your insurance company until all   pre-operative testing requirements are met. Please remember you need to schedule to attend one Support Group meeting held at the [facility] before surgery. This one meeting is mandatory. You can attend as many support group meetings before and after surgery. Support group meetings are held at the [facility] from 6:00 - 8:00pm 1st, and 3rd Tuesday of every month. See dates listed below. Each individual person has his / her own medical requirements that need fulfilled before being able to schedule bariatric surgery . Please finalize these requirements by contacting our Bariatric Nurse at [phone].

## 2022-09-07 NOTE — PROGRESS NOTES
[employer] and [address] [place] [address] [facility]:  Initial Nutrition Consultation    Today's Date: 9/7/2022  Patients Name:Sumeet Frazier     Height: 6' (1.829 m)   Weight: (!) 315 lb (142.9 kg)   IBW: 188 lbs  %IBW: 168%  Excess Weight: 127 lbs  BMI: Body mass index is 42.72 kg/m². Subjective Information:   Patient has tried multiple means of weight loss including diet and exercise in the past and has been unable to maintain a healthy weight. Pt states he / she has tried the following - 30 Day Diet, Nutra System and Weight Watchers. Patients overall goal is to be able to lose weight with diet and exercise by changing lifestyle, habits and maintain a healthy weight for a lifetime. Are your currently Diabetic  - Yes  Type 2 DM: Yes  Diabetes Medication: Metformin    Most successful diet in the past? [address]  Length of time patient was on the diet listed above? 1/1/2016 - 8/1/2016  Weight lost on the diet listed above? 55 lbs  Patient stated he / she maintained his / her weight for the following time? Pt states a few weeks before it came back. Patient takes the following vitamins, minerals and dietary supplements? No - I do not currently take a daily complete multi-vitamin. Min Escamilla / Cheryl Hidalgo has educated the patient that we recommend in order to decrease infection rates patient start a daily complete multi-vitamin from now until surgery if proceeding with surgery. Yes - I currently take a Vitamin C 1,000iu 4 times daily and Vitamin D 5,000iu 2 times daily. Rd / Ld recommends the patient continue the following supplements from now until surgery. Patient consumes the following beverage daily?  Water    Patient states he / she has the following problems:  no - Eating  no - Chewing  no - Swallowing  no - Nausea  no - Vomiting  no - Diarrhea  no - Constipation  no - Hair Changes  no - Missing Teeth  no - Wears Dentures  Food Allergies: no  -   Food Intolerances: no -     Pt identifies his / her eating habits as the following:     Does not eat fruits or vegetables daily. Never eats fruits. Never eats vegetables. Consumes high amounts of processed foods. Daily high intake of empty calorie foods (sweets, fatty / salty foods). Yes - Past medically assisted weight loss history reviewed. Yes - Provided education regarding the basic role of nutrition in junction with Bariatric Surgery. Yes - Provided overview of required dietary and behavioral changes pre and post operatively. Yes - Stated / reinforced that changes in dietary behaviors are critical to successful, long term weight Loss. Patient is aware that in order to proceed with bariatric surgery he / she will need to follow a low-fat diet prior to surgery. Patient is aware that bariatric surgery is a lifetime change that will require the patient to follow a low-fat, low-calorie, low-sugar, portion controlled diet with at least 30 minutes of physical activity daily. Patient is aware that certain foods may not be tolerated after bariatric surgery and certain foods will need avoided all together. Pt is aware supplementation of certain micro nutrients is lifelong along with the use of tracking both macro and micro nutrient intake daily after weight loss surgery. Pt is part of a comprehensive surgical weight loss program that is educating the patient on proper food choices, meal planning, portion control and label reading for use both before and after weight loss surgery. Pt is able to set measurable attainable nutrition goals that reinforce desired post-operative eating patterns when it comes to family, home based, or work settings. Mindful eating skills are being developed by the patient and how to identify the patients sense of hunger and fullness are being addressed.       [address] / LD feels that this patient knowledge / readiness to make appropriate diet / lifestyle changes assessed to be? - Good    RD / LD feels this patients expected adherence for post surgical diet? - Good    Patient was instructed and signed consents on a low-fat diet and required supplementation at initial consult. Comments: Patient is able to verbalize diet concepts for bariatric surgery. Patient is aware diet concepts will be reinforced at monthly pre-op weight loss counseling appointments. Pt will also attend a 3-hour nutrition education class once scheduled for surgery . RD / LD will monitor progress.

## 2022-09-07 NOTE — TELEPHONE ENCOUNTER
Prasanna Yu called the pt and scheduled the pt for the following. Pt is aware he/she needs to be down to their pre-op weight loss goal when they come in for their weight check or their sx will be cx. Pt verbalized understanding. Pre-op weight loss goal reviewed. Pt scheduled for the following see below. Pt is aware supplements are cash, check, credit or debt card. SX Type: RYGB  SX Date: ???  H&P Appt: Dr. Landon Mathur ???  Weight Check Appt: This will occur after the 3 hour class at the [facility]  3 Hour Class: At the [facility] From 8:00 - 11:00 pm on - 9/21/22  Supplements: Pt was provided a handout on approved protein supplements for use after WLS. (Handout - Given 9/7/22)Pt was instructed he / she will need to bring his / her protein supplements to the class in order to move forward with sx or sx can be cx. Handouts reviewed and provided. Pt verbalized understanding. Pt will purchase the Bariatric approved MVI, Fe+, Ca+ and Vit D at the [facility]. 2 Hour Pt Education Book: Pt needs      Pt was instructed he / she can call any time with questions. Goal Weight 303 lbs - Pt has copy of pre-op diet. Enc pt to follow pre-op diet to get down to pre-op weight loss goal. Pt verbalized understanding.   Pt is aware sx can be cx by his / her surgeon at H&P appt if he / she feels pt has not achieved pre-op weight loss goal.

## 2022-09-08 ENCOUNTER — TELEPHONE (OUTPATIENT)
Dept: BARIATRICS/WEIGHT MGMT | Age: 62
End: 2022-09-08

## 2022-09-08 NOTE — TELEPHONE ENCOUNTER
Patient seen in office for initial consult with Dr. Janeth Long on 9-2-2022. He had completed most requirements at [facility] so is ready for [employer],. Discussed available dates with patient, he would like 10-. Case prepared and submitted online to [address] [facility] with request for LRYGB 10-. Online confirmation received and patient notified of insurance submission.

## 2022-09-08 NOTE — LETTER
Date: 9-8-2022    Sweta:  Attention Prior Authorization      Regarding:  Shamar Miller  Member ID:  [id number]    Request:     Authorization for CPT 19680  (Laparoscopic Lisa-en-Y)                      Diagnosis Codes:  E66.01; E78.5; G47.33; M19.90    Physician: Coni Walker M.D.  ([facility] [id number])           (NPI [id number])    Facility: [address] [place] [id number] (NPI [id number])    [facility] [address]     Dear Javier Núñez or :     Pre-determination of insurance coverage, and authorization for hospitalization and surgical treatment are requested on behalf of your annuitant Shamar Miller for diagnoses of morbid obesity, hyperlipidemia, obstructive sleep apnea and degenerative joint disease. Shamar Miller is 6'0, weighs 315 lb., and has a BMI of 42.7. He has been severely obese for a number of years despite many years of dietary efforts. He has lost weight through these efforts, however has been unable to maintain satisfactory weight loss. Shamar Miller has been evaluated in our bariatric program and is felt to be an excellent candidate  for surgery. The patient has undergone extensive pre-operative education and understands all the risks, benefits and possible complications of surgery. He has also undergone thorough nutritional evaluation and counseling with our registered dietician. Our program provides long term nutritional counseling with unlimited consults with the dietician. All patients are nicotine tested and require a negative nicotine level prior to surgery. Patient has been educated about the risks associated with substance use, as well as the risks of using nicotine and alcohol after surgery. Patient has been educated that these substances need to be avoided lifelong after surgery to reduce the risk of complications and sub-optimal weight loss.     We are accredited as a [address] through [facility] and as such we have a multidisciplinary preoperative program. Tonie Clark did participate and comply fully with the preoperative preparatory program which included surgical evaluation, medical workup and clearance, pulmonary workup and clearance, psychological evaluation and clearance, nutritional evaluation and 4 months of provider supervised dietary counseling. He has also attended pre-operative support group meetings facilitated by our LISW and is encouraged to continue attendance post-operatively. I am requesting authorization for Laparoscopic Lisa-en-Y Gastric Bypass, procedure code 28399, with a hospital stay of 1 day, to be performed for the treatment of the patients severe and life threatening diseases. This procedure will be performed at [address] [place] [address]. I appreciate your consideration in this matter and your timely response. Supporting clinical documents are included with this request.    Electronically signed by Dr. Yo Morgan M.D.   Bariatric Surgery

## 2022-09-21 ENCOUNTER — INITIAL CONSULT (OUTPATIENT)
Dept: BARIATRICS/WEIGHT MGMT | Age: 62
End: 2022-09-21

## 2022-09-21 DIAGNOSIS — Z71.3 NUTRITIONAL COUNSELING: ICD-10-CM

## 2022-09-21 DIAGNOSIS — Z00.8 NUTRITIONAL ASSESSMENT: Primary | ICD-10-CM

## 2022-09-21 PROCEDURE — 99999 PR OFFICE/OUTPT VISIT,PROCEDURE ONLY: CPT | Performed by: DIETITIAN, REGISTERED

## 2022-09-22 ENCOUNTER — TELEPHONE (OUTPATIENT)
Dept: BARIATRICS/WEIGHT MGMT | Age: 62
End: 2022-09-22

## 2022-09-22 NOTE — TELEPHONE ENCOUNTER
Per order of Dr. Eliazar Alba patient is ready to be scheduled for [facility]. Call placed to patient and he is in agreement with surgery on 10-18-22. Pre-op class is completed and patient purchased supplements at that visit. Pre-op letter will be mailed. He is working towards pre-op weight loss goal.   He does not smoke and will refrain from alcohol use. We reviewed at length all meds to avoid 2 weeks prior to surgery and patient voiced understanding. This list is in the pre-op letter which will be mailed to patient. Reviewed with patient instructions for Gatorade the night before surgery. Included written instructions with pre-op letter with consideration if patient is on insulin. He uses a CPAP with a setting of 7. He has final medical clearance. Patient placed on [employer] calendar. Patient information faxed to surgery. Patient denies latex allergy. Patient denies PONV.

## 2022-09-28 VITALS — HEIGHT: 72 IN | BODY MASS INDEX: 42.12 KG/M2 | WEIGHT: 311 LBS

## 2022-09-28 NOTE — PROGRESS NOTES
Patient attended a 3 Hour Initial Nutrition Consult Class for RYGB on 9/21/22. Patient was able to verbalize understanding of the class. Barry Gastelum and [facility]  Nutrition History and Physical     Daryl Meyers    Surgery Type: RYGB  Today's Date: 9/28/22    yes  Patient attended a 3 hour nutrition education class on 9/21/22, and was given written educational material.  Patient signed and verbalized understanding of nutrition therapy for Bariatric Surgery. Assessment:              Vitals:    09/28/22 1541   Weight: (!) 311 lb (141.1 kg)   Height: 6' (1.829 m)    Height: 6' (1.829 m) Weight: (!) 311 lb (141.1 kg)   BMI:Body mass index is 42.18 kg/m².      Food Allergies and Allergies: No    Food Intolerances: No   Eating Problems:No  Chewing Problems:No  Swallowing Problems:No    Current Vitamins/Supplements and Medications:  Current Outpatient Medications:     Fluticasone furoate-vilanterol (BREO ELLIPTA) 200-25 MCG/INH AEPB inhaler, Inhale 1 puff into the lungs daily, Disp: 180 each, Rfl: 3    albuterol (PROVENTIL) (2.5 MG/3ML) 0.083% nebulizer solution, Take 3 mLs by nebulization every 6 hours as needed for Wheezing (Patient not taking: Reported on 5/27/2022), Disp: 120 each, Rfl: 3    albuterol sulfate  (90 Base) MCG/ACT inhaler, Inhale 2 puffs into the lungs every 6 hours as needed for Wheezing (Patient not taking: Reported on 5/27/2022), Disp: 18 g, Rfl: 3    rosuvastatin (CRESTOR) 10 MG tablet, Take 1 tablet by mouth daily, Disp: 90 tablet, Rfl: 3    Famotidine (PEPCID PO), Take 1 tablet by mouth daily, Disp: , Rfl:     Ascorbic Acid (VITAMIN C) 1000 MG tablet, Take 1,000 mg by mouth daily , Disp: , Rfl:     valACYclovir (VALTREX) 1 g tablet, Take 1 g by mouth as needed (Patient not taking: Reported on 5/27/2022), Disp: , Rfl:     Cholecalciferol (VITAMIN D) 2000 units CAPS capsule, Take 1 capsule by mouth daily , Disp: , Rfl:     aspirin 81 MG tablet, Take 81 mg by mouth daily, Disp: , Rfl:     levothyroxine (SYNTHROID) 150 MCG tablet, Take 150 mcg by mouth Daily, Disp: , Rfl:     metFORMIN (GLUCOPHAGE) 1000 MG tablet, Take 1,000 mg by mouth 2 times daily (with meals), Disp: , Rfl:   _    Please check all that apply:  Yes - Patient lost 10% of excess body weight prior to surgery  Yes - Patient  is able to verbalize a Bariatric Full Liquid Diet. Yes - Patient is able to verbalize the usage & importance of Protein Supplements. Yes- Patient purchased 3 month supply of protein vitamins and calcium. YES - Patient is instructed to follow a low-fat diet from now until surgery date. YES - Patient is instructed to take 30 grams of a protein supplement from  now until surgery date in addition to low-fat diet guidelines. YES - Patient is instructed to consume 64 ounces of water daily from now until surgery date.  ________________________________________________________________________  Yes - Patient did lose 10% of excess body weight prior to surgery  ________________________________________________________________________  Yes - Patient did purchase 3 month supply of protein, vitamins and Calcium.     Comments:   [address] Supplements - Pt needs to bring his protein supplement to his H&P appt

## 2022-09-28 NOTE — PATIENT INSTRUCTIONS
[facility]  Dietary Follow-up Appointment Instructions    Staci Shaw   Date: 9/28/2022     The RD / LD reviewed the following instructions with the patient and handouts have been given. Pt. is able to verbalize instruction and has been instructed to call with any problems or complications. If patient is not able to comply with the dietary or supplement instructions given pt. is instructed to call the [facility] at [phone]. Patient has been instructed to continue to follow a low-fat diet from today's date until the day before surgery. Patient has been instructed to drink 64 oz. of water daily eliminating carbonated and caffeinated beverages. ________________________________________________________________________________________________________________________________________________  3 Month Supply of Supplements:    Since patient did not purchase his protein supplement. Pt will need to bring his protein supplement to his H&P appt if pt is proceeding with WLS.    ___________________________________________________________________________________________________________

## 2022-09-30 ENCOUNTER — OFFICE VISIT (OUTPATIENT)
Dept: BARIATRICS/WEIGHT MGMT | Age: 62
End: 2022-09-30
Payer: COMMERCIAL

## 2022-09-30 VITALS
SYSTOLIC BLOOD PRESSURE: 167 MMHG | WEIGHT: 308 LBS | RESPIRATION RATE: 20 BRPM | TEMPERATURE: 97.7 F | HEIGHT: 72 IN | HEART RATE: 79 BPM | BODY MASS INDEX: 41.72 KG/M2 | DIASTOLIC BLOOD PRESSURE: 75 MMHG

## 2022-09-30 DIAGNOSIS — K91.2 MALNUTRITION FOLLOWING GASTROINTESTINAL SURGERY: Primary | ICD-10-CM

## 2022-09-30 DIAGNOSIS — R11.2 PONV (POSTOPERATIVE NAUSEA AND VOMITING): ICD-10-CM

## 2022-09-30 DIAGNOSIS — Z98.890 PONV (POSTOPERATIVE NAUSEA AND VOMITING): ICD-10-CM

## 2022-09-30 DIAGNOSIS — K21.9 GASTROESOPHAGEAL REFLUX DISEASE WITHOUT ESOPHAGITIS: ICD-10-CM

## 2022-09-30 PROCEDURE — 99213 OFFICE O/P EST LOW 20 MIN: CPT

## 2022-09-30 PROCEDURE — 99214 OFFICE O/P EST MOD 30 MIN: CPT | Performed by: SURGERY

## 2022-09-30 RX ORDER — ONDANSETRON 4 MG/1
4 TABLET, ORALLY DISINTEGRATING ORAL EVERY 8 HOURS PRN
Qty: 15 TABLET | Refills: 0 | Status: SHIPPED | OUTPATIENT
Start: 2022-09-30

## 2022-09-30 RX ORDER — OMEPRAZOLE 20 MG/1
20 CAPSULE, DELAYED RELEASE ORAL DAILY
Qty: 30 CAPSULE | Refills: 12 | Status: SHIPPED | OUTPATIENT
Start: 2022-09-30 | End: 2023-09-30

## 2022-09-30 NOTE — PATIENT INSTRUCTIONS
Please follow the instruction sheets you received today for your pre-surgical skin and bowel prep. It is very important that your abdomen is properly cleaned and your bowel is cleansed of stool prior to surgery to decrease the chance of any complications. Make sure that you do not eat food or drink fluids after midnight prior to your surgery. If you eat or drink within 8 hours of your surgery, your procedure will be cancelled. Hold your medications as well unless you receive special instruction from either your surgeon or the anesthesiologist to take one of your medications with a small sip of water. Please be advised that most patients are now released from the hospital the day after surgery, regardless of procedure (Band, Bypass, or Sleeve), if they are progressing well and feel ready for discharge. You will see your surgeon prior to your hospital release so that he can examine you and discuss your discharge needs. Please call the [facility] with any questions you may have [phone]. Skin Prep    Home Instruction for Preoperative Antibacterial Dial Soap    Reason for using this soap before surgery:    - To decrease the potential for wound infection after surgery    How to use:    - Obtain Antibacterial Dial soap, either in bar or liquid form from your local store. The soap must be the Antibacterial type of Dial.    Unless you are allergic to this product or notice that it is causing skin irritation, wash with this soap from now until surgery. Make sure to shower with this soap the morning of your surgery before departing for the hospital.    Use a clean wash cloth or new body sponge. Wash from the neck down, paying particular attention to the abdominal area and belly button. Be gentle. DO NOT irritate or scrub the skin until red. Rinse thoroughly and pat dry with a clean towel. Dress with clean clothing.     Do not apply lotions or powders the morning of surgery.

## 2022-09-30 NOTE — PROGRESS NOTES
Jade Hernandez  9/30/2022  [id number]  [address] [facility]  [facility]               History and Physical  Gastric Bypass     CHIEF COMPLAINT: Morbid obesity,  malnutrition, Hyperlipidemia, Obstructive Sleep Apnea treated with BiPAP/CPAP, GERD, Degenerative Joint Disease (DJD), and History of Nephrolithiasis    HISTORY OF PRESENT ILLNESS: Jade Hernandez is a morbidly-obese 58 y.o.  male, who weighs (!) 308 lb (139.7 kg). He is 120 pounds over his ideal body weight. The Body mass index is 41.77 kg/m². He has lost 7 pounds over the past several months in preparation for surgery. He has multiple medical problems aggravated by his obesity. He wishes to have a gastric bypass so that he can lose more weight and keep the weight off. I have met with him on 2 different occasions in the [facility], where we discussed the surgery in great detail and went over the risks and benefits. He has watched our informational video so he understands all of the extensive risks involved. He states that he understands all of these risks and wishes to proceed.     Allergies   Allergen Reactions    Provigil [Modafinil] Dermatitis     Severe dermatitis       Current Outpatient Medications on File Prior to Visit   Medication Sig Dispense Refill    Fluticasone furoate-vilanterol (BREO ELLIPTA) 200-25 MCG/INH AEPB inhaler Inhale 1 puff into the lungs daily 180 each 3    albuterol (PROVENTIL) (2.5 MG/3ML) 0.083% nebulizer solution Take 3 mLs by nebulization every 6 hours as needed for Wheezing 120 each 3    albuterol sulfate  (90 Base) MCG/ACT inhaler Inhale 2 puffs into the lungs every 6 hours as needed for Wheezing 18 g 3    rosuvastatin (CRESTOR) 10 MG tablet Take 1 tablet by mouth daily 90 tablet 3    Famotidine (PEPCID PO) Take 1 tablet by mouth daily      Ascorbic Acid (VITAMIN C) 1000 MG tablet Take 1,000 mg by mouth daily       valACYclovir (VALTREX) 1 g tablet Take 1 g by mouth as needed      Cholecalciferol (VITAMIN D) 2000 units CAPS capsule Take 1 capsule by mouth daily       aspirin 81 MG tablet Take 81 mg by mouth daily      levothyroxine (SYNTHROID) 150 MCG tablet Take 150 mcg by mouth Daily      metFORMIN (GLUCOPHAGE) 1000 MG tablet Take 1,000 mg by mouth 2 times daily (with meals)       No current facility-administered medications on file prior to visit.        Past Medical History:   Diagnosis Date    Hyperlipidemia     Hyperthyroidism     Kidney stone     Morbid obesity due to excess calories ([address]     Sarcoidosis     Unspecified sleep apnea        Past Surgical History:   Procedure Laterality Date    CARPAL TUNNEL RELEASE Bilateral     KNEE SURGERY      x2 last 2/2015    UPPER GASTROINTESTINAL ENDOSCOPY  04/29/2022       Current Outpatient Medications   Medication Sig Dispense Refill    omeprazole (PRILOSEC) 20 MG delayed release capsule Take 1 capsule by mouth Daily 30 capsule 12    ondansetron (ZOFRAN-ODT) 4 MG disintegrating tablet Take 1 tablet by mouth every 8 hours as needed for Nausea or Vomiting 15 tablet 0    Fluticasone furoate-vilanterol (BREO ELLIPTA) 200-25 MCG/INH AEPB inhaler Inhale 1 puff into the lungs daily 180 each 3    albuterol (PROVENTIL) (2.5 MG/3ML) 0.083% nebulizer solution Take 3 mLs by nebulization every 6 hours as needed for Wheezing 120 each 3    albuterol sulfate  (90 Base) MCG/ACT inhaler Inhale 2 puffs into the lungs every 6 hours as needed for Wheezing 18 g 3    rosuvastatin (CRESTOR) 10 MG tablet Take 1 tablet by mouth daily 90 tablet 3    Famotidine (PEPCID PO) Take 1 tablet by mouth daily      Ascorbic Acid (VITAMIN C) 1000 MG tablet Take 1,000 mg by mouth daily       valACYclovir (VALTREX) 1 g tablet Take 1 g by mouth as needed      Cholecalciferol (VITAMIN D) 2000 units CAPS capsule Take 1 capsule by mouth daily       aspirin 81 MG tablet Take 81 mg by mouth daily      levothyroxine (SYNTHROID) 150 MCG tablet Take 150 mcg by mouth Daily metFORMIN (GLUCOPHAGE) 1000 MG tablet Take 1,000 mg by mouth 2 times daily (with meals)       No current facility-administered medications for this visit. Allergies   Allergen Reactions    Provigil [Modafinil] Dermatitis     Severe dermatitis       Family History   Problem Relation Age of Onset    Cancer Mother         lung    Cancer Father         colon    Cancer Maternal Grandmother         lung       Social History     Socioeconomic History    Marital status:      Spouse name: Not on file    Number of children: Not on file    Years of education: Not on file    Highest education level: Not on file   Occupational History    Occupation:  self employed   Tobacco Use    Smoking status: Never    Smokeless tobacco: Never   Vaping Use    Vaping Use: Never used   Substance and Sexual Activity    Alcohol use:  Yes     Alcohol/week: 0.0 standard drinks     Comment: socially    Drug use: No    Sexual activity: Not Currently     Partners: Female   Other Topics Concern    Not on file   Social History Narrative    Not on file     Social Determinants of Health     Financial Resource Strain: Not on file   Food Insecurity: Not on file   Transportation Needs: Not on file   Physical Activity: Not on file   Stress: Not on file   Social Connections: Not on file   Intimate Partner Violence: Not on file   Housing Stability: Not on file       Review of Systems  General ROS: negative for - chills, fatigue or malaise  ENT ROS: negative for - hearing change, nasal congestion or nasal discharge  Allergy and Immunology ROS: negative for - hives, itchy/watery eyes or nasal congestion  Hematological and Lymphatic ROS: negative for - blood clots, blood transfusions, bruising or fatigue  Endocrine ROS: negative for - malaise/lethargy, mood swings, palpitations or polydipsia/polyuria  Respiratory ROS: negative for - sputum changes, stridor, tachypnea or wheezing  Cardiovascular ROS: negative for - irregular heartbeat, loss of consciousness, murmur or orthopnea  Gastrointestinal ROS: negative for - constipation, diarrhea, gas/bloating, heartburn or hematemesis  Genito-Urinary ROS: negative for -  genital discharge, genital ulcers or hematuria  Musculoskeletal ROS: negative for - gait disturbance, muscle pain or muscular weakness  Psychiatric ROS: negative for - visual or auditory hallucinations, suicidal ideation      Physical Exam:   Vitals: BP (!) 167/75 (Site: Left Lower Arm, Position: Sitting, Cuff Size: Large Adult)   Pulse 79   Temp 97.7 °F (36.5 °C) (Temporal)   Resp 20   Ht 6' (1.829 m)   Wt (!) 308 lb (139.7 kg)   BMI 41.77 kg/m²     General appearance: AAO, NAD  Eyes: PERRL, EOMI, red conjunctiva  Lungs: CTAB, no wheeze, no rhonchi  Chest wall: atraumatic, no tenderness, no echymosis or abrasions  Heart: reg rate, no murmur  Abdomen: soft, nondistended, nontender  Extremities: full ROM all 4 ext, no gross motor or sensory deficits  Pulses: 2+ distal  Skin: warm and dry  Neurologic: spontanous eye opening, purposeful, follows complex commands  Psych: No hallucinations      Assessment:  Morbid obesity with failure of conservative therapy. Patient has been cleared psychologically and medically. The gall bladder ultrasound was normal. Upper endoscopy showed no hiatal hernia. The patient was informed that risks include, but are not limited to: death, anastomotic leak, obstruction, bleeding, and sepsis. Any of these could require further surgery. Other risks include heart attack, DVT, PE, pneumonia, hernia, wound infection, the need for dilatations of the gastrojejunostomy, and the inability to lose appropriate weight and keep it off. We may not be able to do a Gastic Bypass, in that case we will do a Sleeve Gastrectomy. We discussed that our goal is to ameliorate the medical problems and not to obtain a specific body mass index. He understands the risks and benefits and wishes to proceed with the procedure.  He has

## 2022-10-04 ENCOUNTER — TELEPHONE (OUTPATIENT)
Dept: BARIATRICS/WEIGHT MGMT | Age: 62
End: 2022-10-04

## 2022-10-04 NOTE — TELEPHONE ENCOUNTER
Spoke with patient regarding COVID booster- he plans to go today and I explained that was fine for surgery.

## 2022-10-06 ENCOUNTER — HOSPITAL ENCOUNTER (OUTPATIENT)
Dept: PREADMISSION TESTING | Age: 62
Discharge: HOME | End: 2022-10-06
Payer: COMMERCIAL

## 2022-10-06 VITALS
SYSTOLIC BLOOD PRESSURE: 139 MMHG | HEIGHT: 72 IN | BODY MASS INDEX: 40.74 KG/M2 | WEIGHT: 300.8 LBS | DIASTOLIC BLOOD PRESSURE: 76 MMHG | TEMPERATURE: 97.3 F | RESPIRATION RATE: 18 BRPM | OXYGEN SATURATION: 94 % | HEART RATE: 74 BPM

## 2022-10-06 DIAGNOSIS — Z01.818 PRE-OP TESTING: Primary | ICD-10-CM

## 2022-10-06 LAB
ALBUMIN SERPL-MCNC: 4.2 G/DL (ref 3.5–5.2)
ALP BLD-CCNC: 95 U/L (ref 40–129)
ALT SERPL-CCNC: 20 U/L (ref 0–40)
ANION GAP SERPL CALCULATED.3IONS-SCNC: 11 MMOL/L (ref 7–16)
AST SERPL-CCNC: 18 U/L (ref 0–39)
BILIRUB SERPL-MCNC: 0.7 MG/DL (ref 0–1.2)
BUN BLDV-MCNC: 13 MG/DL (ref 6–23)
CALCIUM SERPL-MCNC: 9.4 MG/DL (ref 8.6–10.2)
CHLORIDE BLD-SCNC: 101 MMOL/L (ref 98–107)
CO2: 26 MMOL/L (ref 22–29)
CREAT SERPL-MCNC: 1.1 MG/DL (ref 0.7–1.2)
GFR AFRICAN AMERICAN: >60
GFR NON-AFRICAN AMERICAN: >60 ML/MIN/1.73
GLUCOSE BLD-MCNC: 122 MG/DL (ref 74–99)
HCT VFR BLD CALC: 41.9 % (ref 37–54)
HEMOGLOBIN: 13.8 G/DL (ref 12.5–16.5)
MCH RBC QN AUTO: 27.7 PG (ref 26–35)
MCHC RBC AUTO-ENTMCNC: 32.9 % (ref 32–34.5)
MCV RBC AUTO: 84 FL (ref 80–99.9)
PDW BLD-RTO: 14.3 FL (ref 11.5–15)
PLATELET # BLD: 259 E9/L (ref 130–450)
PMV BLD AUTO: 9.5 FL (ref 7–12)
POTASSIUM REFLEX MAGNESIUM: 4 MMOL/L (ref 3.5–5)
RBC # BLD: 4.99 E12/L (ref 3.8–5.8)
SODIUM BLD-SCNC: 138 MMOL/L (ref 132–146)
TOTAL PROTEIN: 7.4 G/DL (ref 6.4–8.3)
WBC # BLD: 5.3 E9/L (ref 4.5–11.5)

## 2022-10-06 PROCEDURE — 36415 COLL VENOUS BLD VENIPUNCTURE: CPT

## 2022-10-06 PROCEDURE — 80053 COMPREHEN METABOLIC PANEL: CPT

## 2022-10-06 PROCEDURE — 85027 COMPLETE CBC AUTOMATED: CPT

## 2022-10-06 RX ORDER — SODIUM CHLORIDE 9 MG/ML
INJECTION, SOLUTION INTRAVENOUS CONTINUOUS
Status: CANCELLED | OUTPATIENT
Start: 2022-10-06

## 2022-10-06 NOTE — PROGRESS NOTES
[address] [facility]                                                                                                                    PRE OP INSTRUCTIONS FOR  Sonja Schuster        Date: 10/6/2022    Date of surgery: 10/18/22   Arrival Time: Hospital will call you between 5pm and 7pm the evening before with your final arrival time for surgery    Nothing by mouth (NPO) as instructed. ___follow special instructions given by surgical weightloss_________________________________________________________________    Take the following medications with a small sip of water on the morning of Surgery: synthroid, omeprazole, use breo inhaler, use nebulizer if needed and bring rescue inhaler. Diabetics may take evening dose of insulin but none after midnight. If you feel symptomatic or low blood sugar morning of surgery drink 1-2 ounces of apple juice only. Aspirin, Ibuprofen, Advil, Naproxen, Vitamin E and other Anti-inflammatory products should be stopped  before surgery  as directed by your physician. Take Tylenol only unless instructed otherwise by your surgeon. Check with your Doctor regarding stopping Plavix, Coumadin, Lovenox, Eliquis, Effient, or other blood thinners. Do not smoke,use illicit drugs and do not drink any alcoholic beverages 24 hours prior to surgery. You may brush your teeth the morning of surgery. DO NOT SWALLOW WATER    You MUST make arrangements for a responsible adult to take you home after your surgery. You will not be allowed to leave alone or drive yourself home. It is strongly suggested someone stay with you the first 24 hrs. Your surgery will be cancelled if you do not have a ride home. PEDIATRIC PATIENTS ONLY:  A parent/legal guardian must accompany a child scheduled for surgery and plan to stay at the hospital until the child is discharged. Please do not bring other children with you.     Please wear simple, loose fitting clothing to the hospital.  Do not bring valuables (money, credit cards, checkbooks, etc.) Do not wear any makeup (including no eye makeup) or nail polish on your fingers or toes. DO NOT wear any jewelry or piercings on day of surgery. All body piercing jewelry must be removed. Shower the night before surgery with __x_Antibacterial soap /ADINA WIPES________May use deodorant. No creams lotions or powders from the neck down. TOTAL JOINT REPLACEMENT/HYSTERECTOMY PATIENTS ONLY---Remember to bring Blood Bank bracelet to the hospital on the day of surgery. If you have a Living Will and Durable Power of  for Healthcare, please bring in a copy. If appropriate bring crutches, inspirex, WALKER, CANE etc... Notify your Surgeon if you develop any illness between now and surgery time, cough, cold, fever, sore throat, nausea, vomiting, etc.  Please notify your surgeon if you experience dizziness, shortness of breath or blurred vision between now & the time of your surgery. If you have ___dentures, they will be removed before going to the OR; we will provide you a container. If you wear ___contact lenses or ___glasses, they will be removed; please bring a case for them. To provide excellent care visitors will be limited to 2 in the room at any given time. Please bring picture ID and insurance card. During flu season no children under the age of 15 are permitted in the hospital for the safety of all patients. Other please check in at the information desk/main lobby. Please call AMBULATORY CARE if you have any further questions.    [address] [facility]     [address]

## 2022-10-06 NOTE — PROGRESS NOTES
Case and history reviewed with Dr. Brayan Macdonald, anesthesia. No further testing/clearances needed.

## 2022-10-12 LAB
EKG ATRIAL RATE: 69 BPM
EKG P AXIS: 54 DEGREES
EKG P-R INTERVAL: 150 MS
EKG Q-T INTERVAL: 402 MS
EKG QRS DURATION: 94 MS
EKG QTC CALCULATION (BAZETT): 430 MS
EKG R AXIS: 34 DEGREES
EKG T AXIS: 22 DEGREES
EKG VENTRICULAR RATE: 69 BPM

## 2022-10-17 ENCOUNTER — ANESTHESIA EVENT (OUTPATIENT)
Dept: OPERATING ROOM | Age: 62
DRG: 621 | End: 2022-10-17
Payer: COMMERCIAL

## 2022-10-18 ENCOUNTER — HOSPITAL ENCOUNTER (INPATIENT)
Age: 62
LOS: 1 days | Discharge: HOME | DRG: 621 | End: 2022-10-19
Attending: SURGERY | Admitting: SURGERY
Payer: COMMERCIAL

## 2022-10-18 ENCOUNTER — ANESTHESIA (OUTPATIENT)
Dept: OPERATING ROOM | Age: 62
DRG: 621 | End: 2022-10-18
Payer: COMMERCIAL

## 2022-10-18 DIAGNOSIS — E66.01 MORBID OBESITY (HCC): ICD-10-CM

## 2022-10-18 PROBLEM — Z98.84 S/P GASTRIC BYPASS: Status: ACTIVE | Noted: 2022-10-18

## 2022-10-18 LAB
HBA1C MFR BLD: 5.6 % (ref 4–5.6)
METER GLUCOSE: 102 MG/DL (ref 74–99)
METER GLUCOSE: 137 MG/DL (ref 74–99)
METER GLUCOSE: 142 MG/DL (ref 74–99)

## 2022-10-18 PROCEDURE — 1200000000 HC SEMI PRIVATE

## 2022-10-18 PROCEDURE — 8E0W4CZ ROBOTIC ASSISTED PROCEDURE OF TRUNK REGION, PERCUTANEOUS ENDOSCOPIC APPROACH: ICD-10-PCS | Performed by: SURGERY

## 2022-10-18 PROCEDURE — S2900 ROBOTIC SURGICAL SYSTEM: HCPCS | Performed by: SURGERY

## 2022-10-18 PROCEDURE — 2720000010 HC SURG SUPPLY STERILE: Performed by: SURGERY

## 2022-10-18 PROCEDURE — 0D164ZA BYPASS STOMACH TO JEJUNUM, PERCUTANEOUS ENDOSCOPIC APPROACH: ICD-10-PCS | Performed by: SURGERY

## 2022-10-18 PROCEDURE — 2580000003 HC RX 258: Performed by: ANESTHESIOLOGY

## 2022-10-18 PROCEDURE — 49329 UNLSTD LAPS PX ABD PERTM&OMN: CPT | Performed by: SURGERY

## 2022-10-18 PROCEDURE — 6360000002 HC RX W HCPCS

## 2022-10-18 PROCEDURE — 6370000000 HC RX 637 (ALT 250 FOR IP): Performed by: SURGERY

## 2022-10-18 PROCEDURE — 2580000003 HC RX 258: Performed by: SURGERY

## 2022-10-18 PROCEDURE — 43644 LAP GASTRIC BYPASS/ROUX-EN-Y: CPT | Performed by: SURGERY

## 2022-10-18 PROCEDURE — 6360000002 HC RX W HCPCS: Performed by: SURGERY

## 2022-10-18 PROCEDURE — 0WUF47Z SUPPLEMENT ABDOMINAL WALL WITH AUTOLOGOUS TISSUE SUBSTITUTE, PERCUTANEOUS ENDOSCOPIC APPROACH: ICD-10-PCS | Performed by: SURGERY

## 2022-10-18 PROCEDURE — 0DJ08ZZ INSPECTION OF UPPER INTESTINAL TRACT, VIA NATURAL OR ARTIFICIAL OPENING ENDOSCOPIC: ICD-10-PCS | Performed by: SURGERY

## 2022-10-18 PROCEDURE — 2709999900 HC NON-CHARGEABLE SUPPLY: Performed by: SURGERY

## 2022-10-18 PROCEDURE — 94640 AIRWAY INHALATION TREATMENT: CPT

## 2022-10-18 PROCEDURE — 3700000001 HC ADD 15 MINUTES (ANESTHESIA): Performed by: SURGERY

## 2022-10-18 PROCEDURE — 2500000003 HC RX 250 WO HCPCS

## 2022-10-18 PROCEDURE — 3600000009 HC SURGERY ROBOT BASE: Performed by: SURGERY

## 2022-10-18 PROCEDURE — A4216 STERILE WATER/SALINE, 10 ML: HCPCS | Performed by: SURGERY

## 2022-10-18 PROCEDURE — 3700000000 HC ANESTHESIA ATTENDED CARE: Performed by: SURGERY

## 2022-10-18 PROCEDURE — 3600000019 HC SURGERY ROBOT ADDTL 15MIN: Performed by: SURGERY

## 2022-10-18 PROCEDURE — C9113 INJ PANTOPRAZOLE SODIUM, VIA: HCPCS | Performed by: SURGERY

## 2022-10-18 PROCEDURE — 83036 HEMOGLOBIN GLYCOSYLATED A1C: CPT

## 2022-10-18 PROCEDURE — 2500000003 HC RX 250 WO HCPCS: Performed by: SURGERY

## 2022-10-18 PROCEDURE — 88305 TISSUE EXAM BY PATHOLOGIST: CPT

## 2022-10-18 PROCEDURE — 6360000002 HC RX W HCPCS: Performed by: NURSE PRACTITIONER

## 2022-10-18 PROCEDURE — 7100000001 HC PACU RECOVERY - ADDTL 15 MIN: Performed by: SURGERY

## 2022-10-18 PROCEDURE — 7100000000 HC PACU RECOVERY - FIRST 15 MIN: Performed by: SURGERY

## 2022-10-18 PROCEDURE — 82962 GLUCOSE BLOOD TEST: CPT

## 2022-10-18 PROCEDURE — 36415 COLL VENOUS BLD VENIPUNCTURE: CPT

## 2022-10-18 RX ORDER — POTASSIUM CHLORIDE 20 MEQ/1
40 TABLET, EXTENDED RELEASE ORAL PRN
Status: DISCONTINUED | OUTPATIENT
Start: 2022-10-18 | End: 2022-10-19 | Stop reason: HOSPADM

## 2022-10-18 RX ORDER — ONDANSETRON 2 MG/ML
4 INJECTION INTRAMUSCULAR; INTRAVENOUS EVERY 6 HOURS PRN
Status: DISCONTINUED | OUTPATIENT
Start: 2022-10-18 | End: 2022-10-19 | Stop reason: HOSPADM

## 2022-10-18 RX ORDER — ONDANSETRON 2 MG/ML
INJECTION INTRAMUSCULAR; INTRAVENOUS PRN
Status: DISCONTINUED | OUTPATIENT
Start: 2022-10-18 | End: 2022-10-18 | Stop reason: SDUPTHER

## 2022-10-18 RX ORDER — ROCURONIUM BROMIDE 10 MG/ML
INJECTION, SOLUTION INTRAVENOUS PRN
Status: DISCONTINUED | OUTPATIENT
Start: 2022-10-18 | End: 2022-10-18 | Stop reason: SDUPTHER

## 2022-10-18 RX ORDER — OXYCODONE HYDROCHLORIDE 5 MG/1
5 TABLET ORAL EVERY 4 HOURS PRN
Status: DISCONTINUED | OUTPATIENT
Start: 2022-10-18 | End: 2022-10-19 | Stop reason: HOSPADM

## 2022-10-18 RX ORDER — POTASSIUM CHLORIDE 7.45 MG/ML
10 INJECTION INTRAVENOUS PRN
Status: DISCONTINUED | OUTPATIENT
Start: 2022-10-18 | End: 2022-10-19 | Stop reason: HOSPADM

## 2022-10-18 RX ORDER — GLYCOPYRROLATE 0.2 MG/ML
INJECTION INTRAMUSCULAR; INTRAVENOUS PRN
Status: DISCONTINUED | OUTPATIENT
Start: 2022-10-18 | End: 2022-10-18 | Stop reason: SDUPTHER

## 2022-10-18 RX ORDER — LEVOTHYROXINE SODIUM 0.15 MG/1
150 TABLET ORAL DAILY
Status: DISCONTINUED | OUTPATIENT
Start: 2022-10-19 | End: 2022-10-19 | Stop reason: HOSPADM

## 2022-10-18 RX ORDER — ALBUTEROL SULFATE 2.5 MG/3ML
2.5 SOLUTION RESPIRATORY (INHALATION) EVERY 6 HOURS PRN
Status: DISCONTINUED | OUTPATIENT
Start: 2022-10-18 | End: 2022-10-19 | Stop reason: HOSPADM

## 2022-10-18 RX ORDER — SODIUM CHLORIDE 0.9 % (FLUSH) 0.9 %
5-40 SYRINGE (ML) INJECTION PRN
Status: DISCONTINUED | OUTPATIENT
Start: 2022-10-18 | End: 2022-10-18 | Stop reason: HOSPADM

## 2022-10-18 RX ORDER — SODIUM CHLORIDE 0.9 % (FLUSH) 0.9 %
5-40 SYRINGE (ML) INJECTION EVERY 12 HOURS SCHEDULED
Status: DISCONTINUED | OUTPATIENT
Start: 2022-10-18 | End: 2022-10-18 | Stop reason: HOSPADM

## 2022-10-18 RX ORDER — PROPOFOL 10 MG/ML
INJECTION, EMULSION INTRAVENOUS PRN
Status: DISCONTINUED | OUTPATIENT
Start: 2022-10-18 | End: 2022-10-18 | Stop reason: SDUPTHER

## 2022-10-18 RX ORDER — PROMETHAZINE HYDROCHLORIDE 25 MG/1
25 SUPPOSITORY RECTAL EVERY 6 HOURS PRN
Status: DISCONTINUED | OUTPATIENT
Start: 2022-10-18 | End: 2022-10-19 | Stop reason: HOSPADM

## 2022-10-18 RX ORDER — ROSUVASTATIN CALCIUM 10 MG/1
10 TABLET, COATED ORAL NIGHTLY
Status: DISCONTINUED | OUTPATIENT
Start: 2022-10-19 | End: 2022-10-19 | Stop reason: HOSPADM

## 2022-10-18 RX ORDER — ACETAMINOPHEN 500 MG
1000 TABLET ORAL ONCE
Status: COMPLETED | OUTPATIENT
Start: 2022-10-18 | End: 2022-10-18

## 2022-10-18 RX ORDER — METHOCARBAMOL 100 MG/ML
1000 INJECTION, SOLUTION INTRAMUSCULAR; INTRAVENOUS ONCE
Status: COMPLETED | OUTPATIENT
Start: 2022-10-18 | End: 2022-10-18

## 2022-10-18 RX ORDER — SODIUM CHLORIDE, SODIUM LACTATE, POTASSIUM CHLORIDE, CALCIUM CHLORIDE 600; 310; 30; 20 MG/100ML; MG/100ML; MG/100ML; MG/100ML
INJECTION, SOLUTION INTRAVENOUS CONTINUOUS
Status: DISCONTINUED | OUTPATIENT
Start: 2022-10-18 | End: 2022-10-19 | Stop reason: HOSPADM

## 2022-10-18 RX ORDER — SODIUM CHLORIDE 0.9 % (FLUSH) 0.9 %
5-40 SYRINGE (ML) INJECTION EVERY 12 HOURS SCHEDULED
Status: DISCONTINUED | OUTPATIENT
Start: 2022-10-18 | End: 2022-10-19 | Stop reason: HOSPADM

## 2022-10-18 RX ORDER — ONDANSETRON 2 MG/ML
4 INJECTION INTRAMUSCULAR; INTRAVENOUS ONCE
Status: COMPLETED | OUTPATIENT
Start: 2022-10-18 | End: 2022-10-18

## 2022-10-18 RX ORDER — SCOLOPAMINE TRANSDERMAL SYSTEM 1 MG/1
1 PATCH, EXTENDED RELEASE TRANSDERMAL
Status: DISCONTINUED | OUTPATIENT
Start: 2022-10-18 | End: 2022-10-18 | Stop reason: SDUPTHER

## 2022-10-18 RX ORDER — SODIUM CHLORIDE 9 MG/ML
INJECTION, SOLUTION INTRAVENOUS CONTINUOUS
Status: DISCONTINUED | OUTPATIENT
Start: 2022-10-18 | End: 2022-10-18 | Stop reason: HOSPADM

## 2022-10-18 RX ORDER — ONDANSETRON 2 MG/ML
INJECTION INTRAMUSCULAR; INTRAVENOUS
Status: COMPLETED
Start: 2022-10-18 | End: 2022-10-18

## 2022-10-18 RX ORDER — DEXTROSE MONOHYDRATE 100 MG/ML
INJECTION, SOLUTION INTRAVENOUS CONTINUOUS PRN
Status: DISCONTINUED | OUTPATIENT
Start: 2022-10-18 | End: 2022-10-19 | Stop reason: HOSPADM

## 2022-10-18 RX ORDER — ONDANSETRON 2 MG/ML
4 INJECTION INTRAMUSCULAR; INTRAVENOUS
Status: COMPLETED | OUTPATIENT
Start: 2022-10-18 | End: 2022-10-18

## 2022-10-18 RX ORDER — SODIUM CHLORIDE 9 MG/ML
INJECTION, SOLUTION INTRAVENOUS PRN
Status: DISCONTINUED | OUTPATIENT
Start: 2022-10-18 | End: 2022-10-18 | Stop reason: HOSPADM

## 2022-10-18 RX ORDER — MEPERIDINE HYDROCHLORIDE 25 MG/ML
INJECTION INTRAMUSCULAR; INTRAVENOUS; SUBCUTANEOUS
Status: COMPLETED
Start: 2022-10-18 | End: 2022-10-18

## 2022-10-18 RX ORDER — SODIUM CHLORIDE 9 MG/ML
INJECTION, SOLUTION INTRAVENOUS CONTINUOUS
Status: DISCONTINUED | OUTPATIENT
Start: 2022-10-18 | End: 2022-10-19 | Stop reason: HOSPADM

## 2022-10-18 RX ORDER — LIDOCAINE HYDROCHLORIDE 20 MG/ML
INJECTION, SOLUTION INTRAVENOUS PRN
Status: DISCONTINUED | OUTPATIENT
Start: 2022-10-18 | End: 2022-10-18 | Stop reason: SDUPTHER

## 2022-10-18 RX ORDER — INSULIN LISPRO 100 [IU]/ML
0-4 INJECTION, SOLUTION INTRAVENOUS; SUBCUTANEOUS
Status: DISCONTINUED | OUTPATIENT
Start: 2022-10-18 | End: 2022-10-19 | Stop reason: HOSPADM

## 2022-10-18 RX ORDER — METHOCARBAMOL 100 MG/ML
INJECTION, SOLUTION INTRAMUSCULAR; INTRAVENOUS
Status: COMPLETED
Start: 2022-10-18 | End: 2022-10-18

## 2022-10-18 RX ORDER — DEXAMETHASONE SODIUM PHOSPHATE 10 MG/ML
INJECTION, SOLUTION INTRAMUSCULAR; INTRAVENOUS PRN
Status: DISCONTINUED | OUTPATIENT
Start: 2022-10-18 | End: 2022-10-18 | Stop reason: SDUPTHER

## 2022-10-18 RX ORDER — MAGNESIUM SULFATE 1 G/100ML
1000 INJECTION INTRAVENOUS PRN
Status: DISCONTINUED | OUTPATIENT
Start: 2022-10-18 | End: 2022-10-19 | Stop reason: HOSPADM

## 2022-10-18 RX ORDER — INSULIN LISPRO 100 [IU]/ML
0-4 INJECTION, SOLUTION INTRAVENOUS; SUBCUTANEOUS NIGHTLY
Status: DISCONTINUED | OUTPATIENT
Start: 2022-10-18 | End: 2022-10-19 | Stop reason: HOSPADM

## 2022-10-18 RX ORDER — SODIUM CHLORIDE 9 MG/ML
INJECTION, SOLUTION INTRAVENOUS PRN
Status: DISCONTINUED | OUTPATIENT
Start: 2022-10-18 | End: 2022-10-19 | Stop reason: HOSPADM

## 2022-10-18 RX ORDER — MORPHINE SULFATE 2 MG/ML
2 INJECTION, SOLUTION INTRAMUSCULAR; INTRAVENOUS
Status: DISCONTINUED | OUTPATIENT
Start: 2022-10-18 | End: 2022-10-19 | Stop reason: HOSPADM

## 2022-10-18 RX ORDER — BUDESONIDE 0.5 MG/2ML
0.5 INHALANT ORAL 2 TIMES DAILY
Status: DISCONTINUED | OUTPATIENT
Start: 2022-10-18 | End: 2022-10-19 | Stop reason: HOSPADM

## 2022-10-18 RX ORDER — KETOROLAC TROMETHAMINE 30 MG/ML
30 INJECTION, SOLUTION INTRAMUSCULAR; INTRAVENOUS EVERY 6 HOURS PRN
Status: DISCONTINUED | OUTPATIENT
Start: 2022-10-18 | End: 2022-10-19 | Stop reason: HOSPADM

## 2022-10-18 RX ORDER — MEPERIDINE HYDROCHLORIDE 25 MG/ML
25 INJECTION INTRAMUSCULAR; INTRAVENOUS; SUBCUTANEOUS EVERY 5 MIN PRN
Status: DISCONTINUED | OUTPATIENT
Start: 2022-10-18 | End: 2022-10-18 | Stop reason: HOSPADM

## 2022-10-18 RX ORDER — KETOROLAC TROMETHAMINE 30 MG/ML
30 INJECTION, SOLUTION INTRAMUSCULAR; INTRAVENOUS ONCE
Status: COMPLETED | OUTPATIENT
Start: 2022-10-18 | End: 2022-10-18

## 2022-10-18 RX ORDER — OXYCODONE HCL 5 MG/5 ML
5 SOLUTION, ORAL ORAL EVERY 4 HOURS PRN
Status: DISCONTINUED | OUTPATIENT
Start: 2022-10-18 | End: 2022-10-18

## 2022-10-18 RX ORDER — NEOSTIGMINE METHYLSULFATE 1 MG/ML
INJECTION, SOLUTION INTRAVENOUS PRN
Status: DISCONTINUED | OUTPATIENT
Start: 2022-10-18 | End: 2022-10-18 | Stop reason: SDUPTHER

## 2022-10-18 RX ORDER — BUPIVACAINE HYDROCHLORIDE 2.5 MG/ML
INJECTION, SOLUTION EPIDURAL; INFILTRATION; INTRACAUDAL PRN
Status: DISCONTINUED | OUTPATIENT
Start: 2022-10-18 | End: 2022-10-18 | Stop reason: ALTCHOICE

## 2022-10-18 RX ORDER — MIDAZOLAM HYDROCHLORIDE 1 MG/ML
INJECTION INTRAMUSCULAR; INTRAVENOUS PRN
Status: DISCONTINUED | OUTPATIENT
Start: 2022-10-18 | End: 2022-10-18 | Stop reason: SDUPTHER

## 2022-10-18 RX ORDER — ARFORMOTEROL TARTRATE 15 UG/2ML
15 SOLUTION RESPIRATORY (INHALATION) 2 TIMES DAILY
Status: DISCONTINUED | OUTPATIENT
Start: 2022-10-18 | End: 2022-10-19 | Stop reason: HOSPADM

## 2022-10-18 RX ORDER — ENOXAPARIN SODIUM 100 MG/ML
40 INJECTION SUBCUTANEOUS ONCE
Status: COMPLETED | OUTPATIENT
Start: 2022-10-18 | End: 2022-10-18

## 2022-10-18 RX ORDER — PROCHLORPERAZINE EDISYLATE 5 MG/ML
10 INJECTION INTRAMUSCULAR; INTRAVENOUS EVERY 6 HOURS PRN
Status: DISCONTINUED | OUTPATIENT
Start: 2022-10-18 | End: 2022-10-19 | Stop reason: HOSPADM

## 2022-10-18 RX ORDER — 0.9 % SODIUM CHLORIDE 0.9 %
500 INTRAVENOUS SOLUTION INTRAVENOUS ONCE
Status: COMPLETED | OUTPATIENT
Start: 2022-10-18 | End: 2022-10-18

## 2022-10-18 RX ORDER — SODIUM CHLORIDE 0.9 % (FLUSH) 0.9 %
5-40 SYRINGE (ML) INJECTION PRN
Status: DISCONTINUED | OUTPATIENT
Start: 2022-10-18 | End: 2022-10-19 | Stop reason: HOSPADM

## 2022-10-18 RX ORDER — ENOXAPARIN SODIUM 100 MG/ML
40 INJECTION SUBCUTANEOUS EVERY 12 HOURS SCHEDULED
Status: DISCONTINUED | OUTPATIENT
Start: 2022-10-19 | End: 2022-10-19 | Stop reason: HOSPADM

## 2022-10-18 RX ORDER — SCOLOPAMINE TRANSDERMAL SYSTEM 1 MG/1
1 PATCH, EXTENDED RELEASE TRANSDERMAL
Status: DISCONTINUED | OUTPATIENT
Start: 2022-10-21 | End: 2022-10-19 | Stop reason: HOSPADM

## 2022-10-18 RX ORDER — FENTANYL CITRATE 50 UG/ML
INJECTION, SOLUTION INTRAMUSCULAR; INTRAVENOUS PRN
Status: DISCONTINUED | OUTPATIENT
Start: 2022-10-18 | End: 2022-10-18 | Stop reason: SDUPTHER

## 2022-10-18 RX ORDER — ACETAMINOPHEN 160 MG/5ML
650 SUSPENSION, ORAL (FINAL DOSE FORM) ORAL EVERY 6 HOURS
Status: DISCONTINUED | OUTPATIENT
Start: 2022-10-18 | End: 2022-10-19 | Stop reason: HOSPADM

## 2022-10-18 RX ADMIN — FENTANYL CITRATE 50 MCG: 50 INJECTION, SOLUTION INTRAMUSCULAR; INTRAVENOUS at 08:01

## 2022-10-18 RX ADMIN — METHOCARBAMOL 1000 MG: 100 INJECTION, SOLUTION INTRAMUSCULAR; INTRAVENOUS at 09:28

## 2022-10-18 RX ADMIN — SODIUM CHLORIDE, POTASSIUM CHLORIDE, SODIUM LACTATE AND CALCIUM CHLORIDE: 600; 310; 30; 20 INJECTION, SOLUTION INTRAVENOUS at 13:43

## 2022-10-18 RX ADMIN — ONDANSETRON 4 MG: 2 INJECTION INTRAMUSCULAR; INTRAVENOUS at 09:25

## 2022-10-18 RX ADMIN — ONDANSETRON 4 MG: 2 INJECTION INTRAMUSCULAR; INTRAVENOUS at 06:46

## 2022-10-18 RX ADMIN — FENTANYL CITRATE 50 MCG: 50 INJECTION, SOLUTION INTRAMUSCULAR; INTRAVENOUS at 08:12

## 2022-10-18 RX ADMIN — Medication 0.5 MG: at 10:04

## 2022-10-18 RX ADMIN — KETOROLAC TROMETHAMINE 30 MG: 30 INJECTION, SOLUTION INTRAMUSCULAR at 11:25

## 2022-10-18 RX ADMIN — ENOXAPARIN SODIUM 40 MG: 100 INJECTION SUBCUTANEOUS at 06:47

## 2022-10-18 RX ADMIN — DEXAMETHASONE SODIUM PHOSPHATE 10 MG: 10 INJECTION INTRAMUSCULAR; INTRAVENOUS at 07:44

## 2022-10-18 RX ADMIN — MORPHINE SULFATE 2 MG: 2 INJECTION, SOLUTION INTRAMUSCULAR; INTRAVENOUS at 20:50

## 2022-10-18 RX ADMIN — ACETAMINOPHEN 650 MG: 160 SUSPENSION ORAL at 18:05

## 2022-10-18 RX ADMIN — MEPERIDINE HYDROCHLORIDE 25 MG: 25 INJECTION, SOLUTION INTRAMUSCULAR; INTRAVENOUS; SUBCUTANEOUS at 10:14

## 2022-10-18 RX ADMIN — ONDANSETRON 4 MG: 2 INJECTION INTRAMUSCULAR; INTRAVENOUS at 08:43

## 2022-10-18 RX ADMIN — GLYCOPYRROLATE 0.4 MG: 0.2 INJECTION, SOLUTION INTRAMUSCULAR; INTRAVENOUS at 09:02

## 2022-10-18 RX ADMIN — ARFORMOTEROL TARTRATE 15 MCG: 15 SOLUTION RESPIRATORY (INHALATION) at 18:30

## 2022-10-18 RX ADMIN — SODIUM CHLORIDE 40 MG: 9 INJECTION, SOLUTION INTRAMUSCULAR; INTRAVENOUS; SUBCUTANEOUS at 11:26

## 2022-10-18 RX ADMIN — ACETAMINOPHEN 650 MG: 160 SUSPENSION ORAL at 23:47

## 2022-10-18 RX ADMIN — PROPOFOL 140 MG: 10 INJECTION, EMULSION INTRAVENOUS at 07:35

## 2022-10-18 RX ADMIN — FENTANYL CITRATE 100 MCG: 50 INJECTION, SOLUTION INTRAMUSCULAR; INTRAVENOUS at 07:35

## 2022-10-18 RX ADMIN — MIDAZOLAM 2 MG: 1 INJECTION INTRAMUSCULAR; INTRAVENOUS at 07:26

## 2022-10-18 RX ADMIN — Medication 3 MG: at 09:02

## 2022-10-18 RX ADMIN — FENTANYL CITRATE 50 MCG: 50 INJECTION, SOLUTION INTRAMUSCULAR; INTRAVENOUS at 08:57

## 2022-10-18 RX ADMIN — SODIUM CHLORIDE 500 ML: 9 INJECTION, SOLUTION INTRAVENOUS at 11:27

## 2022-10-18 RX ADMIN — CEFOXITIN 2000 MG: 2 INJECTION, POWDER, FOR SOLUTION INTRAVENOUS at 07:43

## 2022-10-18 RX ADMIN — MEPERIDINE HYDROCHLORIDE 25 MG: 25 INJECTION INTRAMUSCULAR; INTRAVENOUS; SUBCUTANEOUS at 10:14

## 2022-10-18 RX ADMIN — BUDESONIDE 500 MCG: 0.5 SUSPENSION RESPIRATORY (INHALATION) at 18:30

## 2022-10-18 RX ADMIN — LIDOCAINE HYDROCHLORIDE 80 MG: 20 INJECTION, SOLUTION INTRAVENOUS at 07:35

## 2022-10-18 RX ADMIN — HYDROMORPHONE HYDROCHLORIDE 0.5 MG: 1 INJECTION, SOLUTION INTRAMUSCULAR; INTRAVENOUS; SUBCUTANEOUS at 10:04

## 2022-10-18 RX ADMIN — ACETAMINOPHEN 650 MG: 160 SUSPENSION ORAL at 11:25

## 2022-10-18 RX ADMIN — SODIUM CHLORIDE: 900 INJECTION, SOLUTION INTRAVENOUS at 08:59

## 2022-10-18 RX ADMIN — Medication 0.5 MG: at 09:36

## 2022-10-18 RX ADMIN — SODIUM CHLORIDE: 9 INJECTION, SOLUTION INTRAVENOUS at 06:42

## 2022-10-18 RX ADMIN — SODIUM CHLORIDE, POTASSIUM CHLORIDE, SODIUM LACTATE AND CALCIUM CHLORIDE: 600; 310; 30; 20 INJECTION, SOLUTION INTRAVENOUS at 20:50

## 2022-10-18 RX ADMIN — ROCURONIUM BROMIDE 40 MG: 10 INJECTION, SOLUTION INTRAVENOUS at 07:35

## 2022-10-18 RX ADMIN — HYDROMORPHONE HYDROCHLORIDE 0.5 MG: 1 INJECTION, SOLUTION INTRAMUSCULAR; INTRAVENOUS; SUBCUTANEOUS at 09:36

## 2022-10-18 RX ADMIN — ROCURONIUM BROMIDE 10 MG: 10 INJECTION, SOLUTION INTRAVENOUS at 08:01

## 2022-10-18 RX ADMIN — SODIUM CHLORIDE: 900 INJECTION, SOLUTION INTRAVENOUS at 07:26

## 2022-10-18 RX ADMIN — ACETAMINOPHEN 1000 MG: 500 TABLET ORAL at 06:44

## 2022-10-18 ASSESSMENT — PAIN SCALES - GENERAL
PAINLEVEL_OUTOF10: 6
PAINLEVEL_OUTOF10: 4
PAINLEVEL_OUTOF10: 4
PAINLEVEL_OUTOF10: 7
PAINLEVEL_OUTOF10: 6
PAINLEVEL_OUTOF10: 4
PAINLEVEL_OUTOF10: 3
PAINLEVEL_OUTOF10: 4
PAINLEVEL_OUTOF10: 3
PAINLEVEL_OUTOF10: 7

## 2022-10-18 ASSESSMENT — PAIN DESCRIPTION - ORIENTATION
ORIENTATION: UPPER;LEFT
ORIENTATION: RIGHT;LEFT

## 2022-10-18 ASSESSMENT — PAIN - FUNCTIONAL ASSESSMENT: PAIN_FUNCTIONAL_ASSESSMENT: 0-10

## 2022-10-18 ASSESSMENT — PAIN DESCRIPTION - FREQUENCY
FREQUENCY: INTERMITTENT
FREQUENCY: CONTINUOUS

## 2022-10-18 ASSESSMENT — PAIN DESCRIPTION - DESCRIPTORS
DESCRIPTORS: ACHING;DISCOMFORT
DESCRIPTORS: ACHING;DISCOMFORT
DESCRIPTORS: ACHING;DULL;SORE;TENDER
DESCRIPTORS: DISCOMFORT
DESCRIPTORS: DISCOMFORT
DESCRIPTORS: ACHING;DISCOMFORT

## 2022-10-18 ASSESSMENT — PAIN DESCRIPTION - PAIN TYPE
TYPE: SURGICAL PAIN

## 2022-10-18 ASSESSMENT — PAIN DESCRIPTION - LOCATION
LOCATION: ABDOMEN

## 2022-10-18 ASSESSMENT — ENCOUNTER SYMPTOMS
SHORTNESS OF BREATH: 1
DYSPNEA ACTIVITY LEVEL: AFTER AMBULATING 2 FLIGHTS OF STAIRS

## 2022-10-18 ASSESSMENT — LIFESTYLE VARIABLES
HOW OFTEN DO YOU HAVE A DRINK CONTAINING ALCOHOL: NEVER
SMOKING_STATUS: 0

## 2022-10-18 NOTE — CONSULTS
Department of Internal Medicine  Internal Medicine Consultation Note    Primary Care Physician: Thuy Castrejon MD   Admitting Physician:  Lidia Sharp MD  Admission date and time: 10/18/2022  6:02 AM    Room:  OR POOL/NONE  Admitting diagnosis: Morbid obesity ([address] [E66.01]  S/P gastric bypass [Z98.84]    Patient Name: Jimmy Antunez  MRN: [id number]    Date of Service: 10/18/2022     Reason for consultation:  Medical management    HISTORY OF PRESENT ILLNESS:    Jimmy Antunez was seen and examined in the PACU. He is mildly drowsy under the recent administration of pain medication and anesthesia, otherwise symptoms are well controlled. No nausea or vomiting. He has not yet passed flatus or had bowel movement in the immediate postoperative phase. He was at baseline state of health prior to the procedure with no significant medical comorbidities reported. No recent fevers or chills, sick contacts or exposures. No headache or acute neurologic symptoms. No chest pains, palpitations, fluttering. No shortness of breath at rest or exertion, cough, phlegm production or hemoptysis. No abdominal symptoms prior to surgery and presently symptoms are well controlled as described above. No acute urinary symptoms reported. We have discussed increased activity and ambulation, oxygen weaning once allowed.     PAST MEDICAL Hx:  Past Medical History:   Diagnosis Date    Hyperlipidemia     Hyperthyroidism     Kidney stone     Morbid obesity due to excess calories ([address]     Sarcoidosis     Unspecified sleep apnea        PAST SURGICAL Hx:   Past Surgical History:   Procedure Laterality Date    CARPAL TUNNEL RELEASE Bilateral     HERNIA REPAIR  08/2021    KNEE SURGERY      x2 last 2/2015    TONSILLECTOMY      UPPER GASTROINTESTINAL ENDOSCOPY  04/29/2022       FAMILY Hx:  Family History   Problem Relation Age of Onset    Cancer Mother         lung    Cancer Father         colon    Cancer Maternal Grandmother         lung HOME MEDICATIONS:  Prior to Admission medications    Medication Sig Start Date End Date Taking?  Authorizing Provider   Fluticasone Furoate-Vilanterol (BREO ELLIPTA IN) Inhale into the lungs   Yes Historical Provider, MD   omeprazole (PRILOSEC) 20 MG delayed release capsule Take 1 capsule by mouth Daily 9/30/22 9/30/23  Jonathan Johnson MD   ondansetron (ZOFRAN-ODT) 4 MG disintegrating tablet Take 1 tablet by mouth every 8 hours as needed for Nausea or Vomiting 9/30/22   Jonathan Johnson MD   albuterol (PROVENTIL) (2.5 MG/3ML) 0.083% nebulizer solution Take 3 mLs by nebulization every 6 hours as needed for Wheezing 4/29/22   Karl Mayer MD   albuterol sulfate  (90 Base) MCG/ACT inhaler Inhale 2 puffs into the lungs every 6 hours as needed for Wheezing 11/19/21   Karl Mayer MD   rosuvastatin (CRESTOR) 10 MG tablet Take 1 tablet by mouth daily 11/17/21   Judson Gray MD   Famotidine (PEPCID PO) Take 1 tablet by mouth daily    Historical Provider, MD   Ascorbic Acid (VITAMIN C) 1000 MG tablet Take 1,000 mg by mouth daily     Historical Provider, MD   valACYclovir (VALTREX) 1 g tablet Take 1 g by mouth as needed 6/28/21   Historical Provider, MD   Cholecalciferol (VITAMIN D) 2000 units CAPS capsule Take 1 capsule by mouth daily     Historical Provider, MD   aspirin 81 MG tablet Take 81 mg by mouth daily    Historical Provider, MD   levothyroxine (SYNTHROID) 150 MCG tablet Take 150 mcg by mouth Daily    Historical Provider, MD   metFORMIN (GLUCOPHAGE) 1000 MG tablet Take 1,000 mg by mouth 2 times daily (with meals)    Historical Provider, MD       ALLERGIES:  Provigil [modafinil]    SOCIAL Hx:  Social History     Socioeconomic History    Marital status:      Spouse name: Not on file    Number of children: Not on file    Years of education: Not on file    Highest education level: Not on file   Occupational History    Occupation:  self employed   Tobacco Use    Smoking status: Never    Smokeless tobacco: Never   Vaping Use    Vaping Use: Never used   Substance and Sexual Activity    Alcohol use: Yes     Alcohol/week: 0.0 standard drinks     Comment: socially    Drug use: No    Sexual activity: Not Currently     Partners: Female   Other Topics Concern    Not on file   Social History Narrative    Not on file     Social Determinants of Health     Financial Resource Strain: Not on file   Food Insecurity: Not on file   Transportation Needs: Not on file   Physical Activity: Not on file   Stress: Not on file   Social Connections: Not on file   Intimate Partner Violence: Not on file   Housing Stability: Not on file       ROS: 12 point review of symptoms was conducted, pertinent positives and negative were reviewed, aside from that all 12 systems were reviewed and negative. PHYSICAL EXAM:  VITALS:  Vitals:    10/18/22 1023   BP: (!) 157/83   Pulse: 78   Resp: (!) 9   Temp:    SpO2: 92%         CONSTITUTIONAL:    Mildly drowsy under the recent administration of pain medication and anesthesia, otherwise cooperative, no apparent distress    EYES:    PERRL, EOMI, sclera clear without icterus, conjunctiva normal    ENT:    Normocephalic, atraumatic, sinuses nontender on palpation. External ears without lesions. Oral pharynx with slightly dry mucus membranes. NECK:    Supple, symmetrical, trachea midline, no adenopathy, thyroid symmetric, not enlarged and no tenderness, skin normal, no bruits, no JVD    HEMATOLOGIC/LYMPHATICS:    No cervical lymphadenopathy and no supraclavicular lymphadenopathy    LUNGS:    Symmetric. No increased work of breathing, diminished air exchange, clear to auscultation bilaterally, no wheezes, rhonchi, or rales,     CARDIOVASCULAR:    Normal apical impulse, regular rate and rhythm, normal S1 and S2,  no murmur noted    ABDOMEN:    Unremarkable postoperative appearance with expected degree of tenderness to light palpation superimposed on a soft nondistended abdomen. Mildly hypoactive bowel sounds diffusely    MUSCULOSKELETAL:    There is no redness, warmth, or swelling of the joints. Tone is normal.    NEUROLOGIC:    Mildly drowsy on occasion, otherwise oriented to name, place and time. Cranial nerves II-XII are grossly intact. Motor is 5 out of 5 bilaterally. SKIN:    No bruising or bleeding. No redness, warmth, or swelling    EXTREMITIES:    Peripheral pulses present. No edema, cyanosis, or swelling.     LABORATORY DATA:  CBC with Differential:    Lab Results   Component Value Date/Time    WBC 5.3 10/06/2022 08:10 AM    RBC 4.99 10/06/2022 08:10 AM    HGB 13.8 10/06/2022 08:10 AM    HCT 41.9 10/06/2022 08:10 AM     10/06/2022 08:10 AM    MCV 84.0 10/06/2022 08:10 AM    MCH 27.7 10/06/2022 08:10 AM    MCHC 32.9 10/06/2022 08:10 AM    RDW 14.3 10/06/2022 08:10 AM    LYMPHOPCT 25.1 04/02/2022 11:07 PM    MONOPCT 8.0 04/02/2022 11:07 PM    BASOPCT 0.7 04/02/2022 11:07 PM    MONOSABS 0.69 04/02/2022 11:07 PM    LYMPHSABS 2.17 04/02/2022 11:07 PM    EOSABS 1.10 04/02/2022 11:07 PM    BASOSABS 0.06 04/02/2022 11:07 PM     BMP:    Lab Results   Component Value Date/Time     10/06/2022 08:10 AM    K 4.0 10/06/2022 08:10 AM     10/06/2022 08:10 AM    CO2 26 10/06/2022 08:10 AM    BUN 13 10/06/2022 08:10 AM    LABALBU 4.2 10/06/2022 08:10 AM    CREATININE 1.1 10/06/2022 08:10 AM    CALCIUM 9.4 10/06/2022 08:10 AM    GFRAA >60 10/06/2022 08:10 AM    LABGLOM >60 10/06/2022 08:10 AM    GLUCOSE 122 10/06/2022 08:10 AM       ASSESSMENT:  Morbid obesity with BMI 40.82 kg per metered squared status post Lisa-en-Y gastric bypass October 18, 2022 by Dr. Hernan Persaud  Pulmonary sarcoidosis following Jacklyn Stanford as an outpatient on conservative medical management  Obstructive sleep apnea secondary to morbid obesity on chronic BiPAP  Non-insulin-dependent diabetes mellitus type 2   Hypothyroidism  Hyperlipidemia    PLAN:  Danny Martinez is a 27-year-old male who had bariatric surgery as discussed above. Symptomatic and supportive care, activity and dietary advancement as per the surgery team.  All medications have been reviewed and appropriately reconciled as per my discretion. Increase activity, ambulation and incentive spirometer usage as we will wean oxygen postoperatively as condition allows. Routine lab will be assessed including A1c, lipid panel and thyroid studies will be addressed accordingly. Underlying co-morbidites will be addressed during hospitalization as well. Labs and vital signs will be monitored closely and addressed accordingly. See additional orders for details.      BOLIVAR Thibodeaux CNP  10:26 AM  10/18/2022    Electronically signed by BOLIVAR Thibodeaux CNP on 10/18/22 at 10:26 AM EDT

## 2022-10-18 NOTE — H&P
Frances Prince  10/18/2022  [id number]  [address] [facility]  [facility] [place] [facility]               History and Physical  Gastric Bypass     CHIEF COMPLAINT: Morbid obesity,  malnutrition, Hyperlipidemia, Obstructive Sleep Apnea treated with BiPAP/CPAP, GERD, Degenerative Joint Disease (DJD), and History of Nephrolithiasis    HISTORY OF PRESENT ILLNESS: Frances Prince is a morbidly-obese 58 y.o.  male, who weighs (!) 308 lb (139.7 kg). He is 120 pounds over his ideal body weight. The Body mass index is 40.82 kg/m². He has lost 7 pounds over the past several months in preparation for surgery. He has multiple medical problems aggravated by his obesity. He wishes to have a gastric bypass so that he can lose more weight and keep the weight off. I have met with him on 2 different occasions in the [facility], where we discussed the surgery in great detail and went over the risks and benefits. He has watched our informational video so he understands all of the extensive risks involved. He states that he understands all of these risks and wishes to proceed. Allergies   Allergen Reactions    Provigil [Modafinil] Dermatitis     Severe dermatitis       No current facility-administered medications on file prior to encounter.      Current Outpatient Medications on File Prior to Encounter   Medication Sig Dispense Refill    Fluticasone Furoate-Vilanterol (BREO ELLIPTA IN) Inhale into the lungs      albuterol (PROVENTIL) (2.5 MG/3ML) 0.083% nebulizer solution Take 3 mLs by nebulization every 6 hours as needed for Wheezing 120 each 3    albuterol sulfate  (90 Base) MCG/ACT inhaler Inhale 2 puffs into the lungs every 6 hours as needed for Wheezing 18 g 3    rosuvastatin (CRESTOR) 10 MG tablet Take 1 tablet by mouth daily 90 tablet 3    Famotidine (PEPCID PO) Take 1 tablet by mouth daily      Ascorbic Acid (VITAMIN C) 1000 MG tablet Take 1,000 mg by mouth daily       valACYclovir (VALTREX) 1 g tablet Take 1 g by mouth as needed      Cholecalciferol (VITAMIN D) 2000 units CAPS capsule Take 1 capsule by mouth daily       aspirin 81 MG tablet Take 81 mg by mouth daily      levothyroxine (SYNTHROID) 150 MCG tablet Take 150 mcg by mouth Daily      metFORMIN (GLUCOPHAGE) 1000 MG tablet Take 1,000 mg by mouth 2 times daily (with meals)         Past Medical History:   Diagnosis Date    Hyperlipidemia     Hyperthyroidism     Kidney stone     Morbid obesity due to excess calories (HCC)     Sarcoidosis     Unspecified sleep apnea        Past Surgical History:   Procedure Laterality Date    CARPAL TUNNEL RELEASE Bilateral     HERNIA REPAIR  08/2021    KNEE SURGERY      x2 last 2/2015    TONSILLECTOMY      UPPER GASTROINTESTINAL ENDOSCOPY  04/29/2022       Current Facility-Administered Medications   Medication Dose Route Frequency Provider Last Rate Last Admin    0.9 % sodium chloride infusion   IntraVENous Continuous Rui Seaman MD        sodium chloride flush 0.9 % injection 5-40 mL  5-40 mL IntraVENous 2 times per day Rui Seaman MD        sodium chloride flush 0.9 % injection 5-40 mL  5-40 mL IntraVENous PRN Rui Seaman MD        0.9 % sodium chloride infusion   IntraVENous PRN Rui Seaman MD        scopolamine (TRANSDERM-SCOP) transdermal patch 1 patch  1 patch TransDERmal Q72H Rui Seaman MD   1 patch at 10/18/22 0645    cefOXitin (MEFOXIN) 2,000 mg in sodium chloride 0.9 % 50 mL IVPB ([id number])  2,000 mg IntraVENous On Call to Cholo Patel MD        0.9 % sodium chloride infusion   IntraVENous Continuous Neetu Andrade MD 50 mL/hr at 10/18/22 9457 New Bag at 10/18/22 7170       Allergies   Allergen Reactions    Provigil [Modafinil] Dermatitis     Severe dermatitis       Family History   Problem Relation Age of Onset    Cancer Mother         lung    Cancer Father         colon    Cancer Maternal Grandmother         lung       Social History     Socioeconomic History    Marital status:      Spouse name: Not on file    Number of children: Not on file    Years of education: Not on file    Highest education level: Not on file   Occupational History    Occupation:  self employed   Tobacco Use    Smoking status: Never    Smokeless tobacco: Never   Vaping Use    Vaping Use: Never used   Substance and Sexual Activity    Alcohol use:  Yes     Alcohol/week: 0.0 standard drinks     Comment: socially    Drug use: No    Sexual activity: Not Currently     Partners: Female   Other Topics Concern    Not on file   Social History Narrative    Not on file     Social Determinants of Health     Financial Resource Strain: Not on file   Food Insecurity: Not on file   Transportation Needs: Not on file   Physical Activity: Not on file   Stress: Not on file   Social Connections: Not on file   Intimate Partner Violence: Not on file   Housing Stability: Not on file       Review of Systems  General ROS: negative for - chills, fatigue or malaise  ENT ROS: negative for - hearing change, nasal congestion or nasal discharge  Allergy and Immunology ROS: negative for - hives, itchy/watery eyes or nasal congestion  Hematological and Lymphatic ROS: negative for - blood clots, blood transfusions, bruising or fatigue  Endocrine ROS: negative for - malaise/lethargy, mood swings, palpitations or polydipsia/polyuria  Respiratory ROS: negative for - sputum changes, stridor, tachypnea or wheezing  Cardiovascular ROS: negative for - irregular heartbeat, loss of consciousness, murmur or orthopnea  Gastrointestinal ROS: negative for - constipation, diarrhea, gas/bloating, heartburn or hematemesis  Genito-Urinary ROS: negative for -  genital discharge, genital ulcers or hematuria  Musculoskeletal ROS: negative for - gait disturbance, muscle pain or muscular weakness  Psychiatric ROS: negative for - visual or auditory hallucinations, suicidal ideation      Physical Exam:   Vitals: BP (!) 127/58 Pulse 78   Temp 98 °F (36.7 °C) (Infrared)   Resp 18   Ht 6' (1.829 m)   Wt (!) 301 lb (136.5 kg)   SpO2 94%   BMI 40.82 kg/m²     General appearance: AAO, NAD  Eyes: PERRL, EOMI, red conjunctiva  Lungs: CTAB, no wheeze, no rhonchi  Chest wall: atraumatic, no tenderness, no echymosis or abrasions  Heart: reg rate, no murmur  Abdomen: soft, nondistended, nontender  Extremities: full ROM all 4 ext, no gross motor or sensory deficits  Pulses: 2+ distal  Skin: warm and dry  Neurologic: spontanous eye opening, purposeful, follows complex commands  Psych: No hallucinations      Assessment:  Morbid obesity with failure of conservative therapy. Patient has been cleared psychologically and medically. The gall bladder ultrasound was normal. Upper endoscopy showed no hiatal hernia. The patient was informed that risks include, but are not limited to: death, anastomotic leak, obstruction, bleeding, and sepsis. Any of these could require further surgery. Other risks include heart attack, DVT, PE, pneumonia, hernia, wound infection, the need for dilatations of the gastrojejunostomy, and the inability to lose appropriate weight and keep it off. We may not be able to do a Gastic Bypass, in that case we will do a Sleeve Gastrectomy. We discussed that our goal is to ameliorate the medical problems and not to obtain a specific body mass index. He understands the risks and benefits and wishes to proceed with the procedure. He has signed a consent form. Plan:  ([id number]) Laparoscopic Lisa-en-Y Gastric Bypass possible hiatal hernia repair. He does not need Lovenox post-op.     Physician Signature: Electronically signed by Dr. Amos Mtz MD      Send copy of H&P to PCP, Tammy Helms MD

## 2022-10-18 NOTE — ANESTHESIA PRE PROCEDURE
Department of Anesthesiology  Preprocedure Note       Name:  Jimmy Antunez   Age:  58 y.o.  :  1960                                          MRN:  [id number]         Date:  10/18/2022      Surgeon: Ashvin Bradley):  Lidia Sharp MD    Procedure: Procedure(s):  GASTRIC BYPASS CATHI-EN-Y LAPAROSCOPIC ROBOTIC XI    Medications prior to admission:   Prior to Admission medications    Medication Sig Start Date End Date Taking?  Authorizing Provider   Fluticasone Furoate-Vilanterol (BREO ELLIPTA IN) Inhale into the lungs   Yes Historical Provider, MD   omeprazole (PRILOSEC) 20 MG delayed release capsule Take 1 capsule by mouth Daily 22  Lidia Sharp MD   ondansetron (ZOFRAN-ODT) 4 MG disintegrating tablet Take 1 tablet by mouth every 8 hours as needed for Nausea or Vomiting 22   Lidia Sharp MD   albuterol (PROVENTIL) (2.5 MG/3ML) 0.083% nebulizer solution Take 3 mLs by nebulization every 6 hours as needed for Wheezing 22   Phyllis Ambrosio MD   albuterol sulfate  (90 Base) MCG/ACT inhaler Inhale 2 puffs into the lungs every 6 hours as needed for Wheezing 21   Phyllis Ambrosio MD   rosuvastatin (CRESTOR) 10 MG tablet Take 1 tablet by mouth daily 21   Addy Cristobal MD   Famotidine (PEPCID PO) Take 1 tablet by mouth daily    Historical Provider, MD   Ascorbic Acid (VITAMIN C) 1000 MG tablet Take 1,000 mg by mouth daily     Historical Provider, MD   valACYclovir (VALTREX) 1 g tablet Take 1 g by mouth as needed 21   Historical Provider, MD   Cholecalciferol (VITAMIN D) 2000 units CAPS capsule Take 1 capsule by mouth daily     Historical Provider, MD   aspirin 81 MG tablet Take 81 mg by mouth daily    Historical Provider, MD   levothyroxine (SYNTHROID) 150 MCG tablet Take 150 mcg by mouth Daily    Historical Provider, MD   metFORMIN (GLUCOPHAGE) 1000 MG tablet Take 1,000 mg by mouth 2 times daily (with meals)    Historical Provider, MD       Current medications: Current Facility-Administered Medications   Medication Dose Route Frequency Provider Last Rate Last Admin    0.9 % sodium chloride infusion   IntraVENous Continuous Pato Dempsey MD        sodium chloride flush 0.9 % injection 5-40 mL  5-40 mL IntraVENous 2 times per day Pato Dempsey MD        sodium chloride flush 0.9 % injection 5-40 mL  5-40 mL IntraVENous PRN Pato Dempsey MD        0.9 % sodium chloride infusion   IntraVENous PRN Pato Dempsey MD        scopolamine (TRANSDERM-SCOP) transdermal patch 1 patch  1 patch TransDERmal Q72H Pato Dempsey MD   1 patch at 10/18/22 0645    cefOXitin (MEFOXIN) 2,000 mg in sodium chloride 0.9 % 50 mL IVPB ([id number])  2,000 mg IntraVENous On Call to Santy Warner MD        0.9 % sodium chloride infusion   IntraVENous Continuous Keagan Jansen MD 50 mL/hr at 10/18/22 0642 New Bag at 10/18/22 0037       Allergies: Allergies   Allergen Reactions    Provigil [Modafinil] Dermatitis     Severe dermatitis       Problem List:    Patient Active Problem List   Diagnosis Code    SANJAY treated with BiPAP G47.33    Sarcoidosis of lung ([facility] [address] D86.0    SOB (shortness of breath) R06.02    CAD in native artery I25.10    S/P gastric bypass Z98.84       Past Medical History:        Diagnosis Date    Hyperlipidemia     Hyperthyroidism     Kidney stone     Morbid obesity due to excess calories ([facility] [address]     Sarcoidosis     Unspecified sleep apnea        Past Surgical History:        Procedure Laterality Date    CARPAL TUNNEL RELEASE Bilateral     HERNIA REPAIR  08/2021    KNEE SURGERY      x2 last 2/2015    TONSILLECTOMY      UPPER GASTROINTESTINAL ENDOSCOPY  04/29/2022       Social History:    Social History     Tobacco Use    Smoking status: Never    Smokeless tobacco: Never   Substance Use Topics    Alcohol use:  Yes     Alcohol/week: 0.0 standard drinks     Comment: socially                                Counseling given: Not Answered      Vital Signs (Current):   Vitals:    10/18/22 0611 10/18/22 0615   BP:  (!) 127/58   Pulse:  78   Resp:  18   Temp:  36.7 °C (98 °F)   TempSrc:  Infrared   SpO2:  94%   Weight: (!) 301 lb (136.5 kg)    Height: 6' (1.829 m)                                               BP Readings from Last 3 Encounters:   10/18/22 (!) 127/58   10/06/22 139/76   09/30/22 (!) 167/75       NPO Status: Time of last liquid consumption: 1900                        Time of last solid consumption: 1900                        Date of last liquid consumption: 10/17/22                        Date of last solid food consumption: 10/17/22    BMI:   Wt Readings from Last 3 Encounters:   10/18/22 (!) 301 lb (136.5 kg)   10/06/22 (!) 300 lb 12.8 oz (136.4 kg)   09/30/22 (!) 308 lb (139.7 kg)     Body mass index is 40.82 kg/m². CBC:   Lab Results   Component Value Date/Time    WBC 5.3 10/06/2022 08:10 AM    RBC 4.99 10/06/2022 08:10 AM    HGB 13.8 10/06/2022 08:10 AM    HCT 41.9 10/06/2022 08:10 AM    MCV 84.0 10/06/2022 08:10 AM    RDW 14.3 10/06/2022 08:10 AM     10/06/2022 08:10 AM       CMP:   Lab Results   Component Value Date/Time     10/06/2022 08:10 AM    K 4.0 10/06/2022 08:10 AM     10/06/2022 08:10 AM    CO2 26 10/06/2022 08:10 AM    BUN 13 10/06/2022 08:10 AM    CREATININE 1.1 10/06/2022 08:10 AM    GFRAA >60 10/06/2022 08:10 AM    LABGLOM >60 10/06/2022 08:10 AM    GLUCOSE 122 10/06/2022 08:10 AM    PROT 7.4 10/06/2022 08:10 AM    CALCIUM 9.4 10/06/2022 08:10 AM    BILITOT 0.7 10/06/2022 08:10 AM    ALKPHOS 95 10/06/2022 08:10 AM    AST 18 10/06/2022 08:10 AM    ALT 20 10/06/2022 08:10 AM       POC Tests: No results for input(s): POCGLU, POCNA, POCK, POCCL, POCBUN, POCHEMO, POCHCT in the last 72 hours.     Coags:   Lab Results   Component Value Date/Time    PROTIME 11.0 10/26/2021 10:42 AM    INR 0.9 10/26/2021 10:42 AM       HCG (If Applicable): No results found for: PREGTESTUR, PREGSERUM, HCG, HCGQUANT     ABGs: No results found for: PHART, PO2ART, ZFJ8YDL, CCW2MOJ, BEART, O8OXXUGD     Type & Screen (If Applicable):  No results found for: LABABO, LABRH    Drug/Infectious Status (If Applicable):  No results found for: HIV, HEPCAB    COVID-19 Screening (If Applicable):   Lab Results   Component Value Date/Time    COVID19 Not Detected 04/02/2022 11:07 PM     EKG 10/6/22  Normal sinus rhythm  Normal ECG  No previous ECGs available     Reviewed:  As above. Confirmed by Aleta Huang ([phone]) on 10/12/2022 10:07:59 AM    Cardiac stress test  10/5/21  Impression:   1. Exercise EKG was positive for ischemia. 2. The patient experienced non-cardiac chest pain with exercise. 3. Herrera treadmill score was -6 implying intermediate risk. 4. Exercise capacity was average. 5. Intermediate risk general exercise treadmill test.     Cardiac cath 10/28/21  CONCLUSIONS:  1. Coronary artery disease. a. Left main, no angiographic disease. b.  LAD, large, tortuous vessel with smooth 30% mid vessel tubular  narrowing with mild myocardial bridging at the site. c.  LCX, large, tortuous vessel with no angiographic disease. d.  RCA, dominant, tortuous vessel with no angiographic disease. 2.  Normal ventricular size and systolic function with an estimated  ejection fraction of 65%. 3.  Normal left ventricular end-diastolic pressure. Latest Reference Range & Units 5/27/22 00:00   FEV1/FVC %Pred-Post % 97%   MEP  103%   MIP  112%     US gallbladder 9/6/22:   Mild fatty liver.       6 mm gallbladder polyp.      CTA chest 4/3/22  HISTORY:   ORDERING SYSTEM PROVIDED HISTORY: Shortness of breath, rule out PE   TECHNOLOGIST PROVIDED HISTORY:   Reason for exam:->r/o pe   Decision Support Exception - unselect if not a suspected or confirmed   emergency medical condition->Emergency Medical Condition (MA)       FINDINGS:   Pulmonary Arteries: Pulmonary arteries are adequately opacified for   evaluation.  No evidence of intraluminal filling defect to suggest pulmonary   embolism.  Main pulmonary artery is normal in caliber.       Mediastinum: No evidence of mediastinal lymphadenopathy.  The heart and   pericardium demonstrate no acute abnormality.  There is no acute abnormality   of the thoracic aorta.       Lungs/pleura: No pneumothorax.  No pleural effusion.  No suspicious pulmonary   nodule or mass.  A small reticulonodular density in the right middle lobe,   image number [id number].  This is most likely benign.  Mild bibasilar dependent   atelectasis.       Upper Abdomen: Diffuse hepatic steatosis.       Soft Tissues/Bones: No acute bone or soft tissue abnormality.  Multilevel   thoracic spondylosis. Anesthesia Evaluation  Patient summary reviewed and Nursing notes reviewed no history of anesthetic complications:   Airway: Mallampati: III  TM distance: <3 FB   Neck ROM: full  Mouth opening: > = 3 FB   Dental: normal exam         Pulmonary: breath sounds clear to auscultation  (+) shortness of breath (used Breo inhaler this AM): chronic,  sleep apnea ( bipap): on noncompliant,      (-) not a current smoker          Patient did not smoke on day of surgery. ROS comment: Sarcoidosis of lung   Cardiovascular:  Exercise tolerance: good (>4 METS),   (+) CAD:, YIN: after ambulating 2 flights of stairs, hyperlipidemia    (-) hypertension, past MI, dysrhythmias and  angina    ECG reviewed  Rhythm: regular  Rate: normal    Stress test reviewed       Beta Blocker:  Not on Beta Blocker         Neuro/Psych:   Negative Neuro/Psych ROS     (-) seizures           GI/Hepatic/Renal:   (+) GERD: well controlled, morbid obesity         ROS comment: Hx: kidney stones. Endo/Other:    (+) hyperthyroidism, blood dyscrasia ( Aspirin 81mg 9/30/22): anticoagulation therapy:., .    Diabetes:  pre-diabetic - takes metformin. Abdominal:             Vascular: negative vascular ROS.          Other Findings:           Anesthesia Plan      general     ASA 3     (20g LH)  Induction: intravenous. Anesthetic plan and risks discussed with patient. Use of blood products discussed with patient whom consented to blood products. Plan discussed with CRNA and attending.                     Rosamaria Aguirre RN   10/18/2022

## 2022-10-18 NOTE — OP NOTE
[facility]    Operative Report  Zaheer Harris MD, MS    DATE OF PROCEDURE: 10/18/2022    SURGEON: Dr. Yoanna Boyer MD, M.D.     Kailee Ek: Terrell Tamez MD (No qualified surgical resident or surgical assistant was available for the case); DO Guzman Roselie Conch,  Assist    PREOPERATIVE DIAGNOSES:   Patient Active Problem List   Diagnosis    SANJAY treated with BiPAP    Sarcoidosis of lung (HCC)    SOB (shortness of breath)    CAD in native artery    S/P gastric bypass       Morbid obesity     POSTOPERATIVE DIAGNOSES:   Same      OPERATION:   1) Laparoscopic Robot assisted Lisa-en-Y gastric bypass   2) Upper endoscopy   3) Omental flap          ANESTHESIA: General endotracheal.     ESTIMATED BLOOD LOSS: 10 mL. COMPLICATIONS: None. HISTORY: Sonja Schuster is a morbidly-obese 58 y.o.  male, who weighs 301 pounds. The Body mass index is 40.82 kg/m². He has multiple medical problems aggravated by his obesity and a hiatal hernia causing reflux. He wishes to have a gastric bypass so that he can lose more weight and keep the weight off and potentially have the hiatal hernia repaired to help decrease the reflux problems. He understands the extensive risks involved in the surgery and wishes to proceed. PROCEDURE: The patient was placed on the table in the supine position and placed under general endotracheal anesthesia. They had SCDs on the legs as DVT prophylaxis. They had Ancef IV. Orogastric tube placed in the stomach, then removed. The abdomen was clipped, then prepped with DuraPrep and draped in the usual sterile fashion. Then, 0.25% Marcaine plus epinephrine was injected into the skin and muscle of each incision to help with postoperative pain control. An 8mm incision was made above the umbilicus and veress needle inserted and confirmed its placement.  The abdomen was insufflated to 15mmHg, the needle removed and 8mm trocar inserted. The camera was inserted and the abdomen inspected. A 12 mm trocar was placed in the right mid abdomen, an 8-mm trocar in the left midabdomen, 8-mm trocar in the right lateral subcostal region. The head of the bed was elevated. 3SP Grouplisbetia Janice liver retractor was placed below the xiphoid. The liver was large and fatty. The liver was retracted methodically. The robot was then docked over the left side. Pars flaccida clear area was opened with synchroseal. The hiatus was inspected and no hiatal hernia was appreciated. The lesser omental vessels were taken down to the lesser curvature of the stomach. The Robotic 60mm blue cartridge was fired horizontally across the stomach to start the pouch. Three more firings vertically out through an opening at the angle of HIS. Pouch size approximately 30 mL. The ligament of Treitz was identified and measured 80 cm and brought up to the stomach pouch in omega loop fashion. The jejunum was sewn to the stomach with a V lock absorbable suture along the back wall of the anastomosis. Hook cautery was used to make a small hole in the anterior wall at the distal gastric staple line and a enterotomy was made in the small bowel opposite to the gastrostomy also with hook cautery. We then insterted a 39 F bougie was placed by Anaesthesia through the anastomosis after the stomach and jejunum were opened with hook catery. The anastamosis was then completed with a second absorbable barbed suture. Vessel sealer was used to make a small opening in the mesentery of the biliary limb and it was then divided with the Robotic white cartridge lateral to the gastric anastomosis. The Bougie was removed. Hook cautery was used to make an enterotomy 5cm from the staple line in the biliary limb. The small bowel was then run 150 cm from the gastrojejunal anastomosis and a loop of jejunum was brought up for the distal anastomosis using the triple-staple technique.  Hook cautery was used to make a small opening in common limb. The Robotic 60 white cartridge was fired, inside the lumen in opposite directions. The enterotomy was closed transversely with a Robotic 60 blue cartridge, finishing the distal anastomosis. The mesenteric defect was closed with a running absorbable barbed suture to prevent internal hernias. The abdomen was filled with saline. A bowel clamp was placed on the jejunum distal to the gastrojejunal anastomosis. An Endoscope was passed down through the mouth. The scope fit past the hiatus without problems and again no hiatal hernia was appreciated. The scope was pushed into the pouch. Old blood was removed with suction. The pouch was inflated with air. There was no bubbling from any of the staple lines indicating they were airtight and watertight. The anastomosis was open approximately 9 mm. The scope was easily passed through the anastomosis into the jejunum. All of the mucosa looked healthy. The scope was withdrawn. Omentum was mobilized from the greater curvature and the transverse colon to reach the gastrojejunal anastomosis. The anastomosis was wrapped with omentum and tacked with a v lock suture. The omentum was mobilized to preserve blood supply and wrapped 360 degrees around the anastomosis to buttress the closure. All of the fluid in the abdomen was removed with suction. All of the CO2 was released. The trocars were removed. Skin wounds were closed with 4-0 Monocryl dermal stitches and Derma+flex glue was applied. The needle and sponge count were correct. The patient tolerated the procedure well and went to recovery in stable condition. Dr Lit Segura was critical in hiatal dissection and performing the anastomosis and upper endoscopy. His assistance was critical in completing the procedure.     Brenda Harper MD

## 2022-10-18 NOTE — PLAN OF CARE
Problem: Discharge Planning  Goal: Discharge to home or other facility with appropriate resources  Outcome: Progressing  Flowsheets (Taken 10/18/2022 1417)  Discharge to home or other facility with appropriate resources: Identify barriers to discharge with patient and caregiver     Problem: Pain  Goal: Verbalizes/displays adequate comfort level or baseline comfort level  Outcome: Progressing     Problem: ABCDS Injury Assessment  Goal: Absence of physical injury  Outcome: Progressing

## 2022-10-18 NOTE — ANESTHESIA POSTPROCEDURE EVALUATION
Department of Anesthesiology  Postprocedure Note    Patient: Juarez Handley  MRN: [id number]  YOB: 1960  Date of evaluation: 10/18/2022      Procedure Summary     Date: 10/18/22 Room / Location: [address] [place] [address] 06 / [facility]    Anesthesia Start: 0725 Anesthesia Stop: 4815    Procedure: GASTRIC BYPASS CATHI-EN-Y LAPAROSCOPIC ROBOTIC XI (Abdomen) Diagnosis:       Morbid obesity ([address]      (Morbid obesity ([address] [E66.01])    Surgeons: Francesca Colón MD Responsible Provider: Mari Jovel MD    Anesthesia Type: General ASA Status: 3          Anesthesia Type: General    Kofi Phase I: Kofi Score: 9    Kofi Phase II:        Anesthesia Post Evaluation    Patient location during evaluation: PACU  Patient participation: complete - patient participated  Level of consciousness: awake and alert  Pain score: 3  Airway patency: patent  Nausea & Vomiting: no nausea  Complications: no  Cardiovascular status: blood pressure returned to baseline  Respiratory status: acceptable  Hydration status: euvolemic

## 2022-10-18 NOTE — DISCHARGE INSTRUCTIONS
Dr. Gadiel Monroe MD, MS  Discharge Instructions for Bariatric Surgery  Lisa-en-Y gastric bypass OR Sleeve Gastrectomy       HOME CARE   Keep the incision area clean and dry. The glue on the incision is waterproof so you can shower. Do not remove the surgical glue. Leave the incisions open to air. Only cover if drainage occurs. Place ice on painful incision for 1-2 days. Make sure you know how to use and continue to use your incentive spirometer every hour while awake at home. DIET   Drink frequently and always carry fluids with you while awake to maintain hydration. Follow the diet instructions that you received in your Nutrition Education Manual for the dates and times of when to start your Bariatric Clear Liquids and Bariatric Full Liquid Diet along with the protein supplements. DO NOT take the Bariatric Multivitamins, Iron, Calcium, Vitamin D or Vitamin b12 supplements until instructed to do so at your 2 week follow up appointment with your surgeon. Slow down intake of liquids if there is chest pressure, acid or fullness. Drinking too fast or too much at one time can result in pain and vomiting. You may notice increased gas or changes in your bowel habits during the first month after surgery. Keep the bowels soft with stool softeners at first, then switch to Metamucil. If you are not able to drink 64 ounces of fluid a day, you will develop constipation. Keep the bowels moving daily. PHYSICAL ACTIVITY   Walk frequently and keep your legs elevated when sitting to prevent blood clots in the legs. Discomfort at the incisions is normal with increasing activity. Severe pain at the incisions is not expected and you should avoid activities that result in severe pain. Increase your activity gradually, walking at least 10 minutes every hour you are awake. Do not drive while taking narcotic pain medication as this can cause drowsiness. You must be narcotic free for at least 24 hours before driving.  Do not bend, twist, pull, or lift over 20 pounds for the first 2 weeks after surgery. Then for the next 2 weeks after that, do not bend, twist, pull, or lift over 50 pounds. If anything causes pain or discomfort, stop! Breathe deeply every hour to prevent pneumonia. Continue to use your incentive spirometer at home as this will help to prevent pneumonia as well. Medications   Restart blood thinners in 24 hours if no signs of bleeding. Take Tylenol for pain. Take Colace 100 mg twice a day. Take Ondansetron (Zofran), Omeprazole (Prilosec), or Sucralfate (Carafate) as prescribed. Follow up with Primary Care Physician (PCP)regarding home medications you were taking prior to surgery or with the prescribing physician if not your PCP. Extended-release medications are not recommended. Your PCP should convert to another medication if possible. DO NOT STOP ANY PRESCRIBED MEDICATION WITHOUT TALKING TO YOUR DOCTOR. If diabetic, check blood sugars as ordered by PCP or Endocrinologist. Follow up with PCP or Endocrinologist regarding diabetic medications. Make sure you take any medicines you were on for depression or anxiety. FOLLOW-UP   Follow-up appointment with surgeon 10-14 days after surgery. Complete lab work prior to this appointment. Follow-up with PCP and/or Endocrinologist prior to seeing surgeon. CALL [facility] WEIGHT LOSS OFFICE [phone] IF ANY OF THE FOLLOWING OCCURS TO BE SEEN IN THE OFFICE:   Signs of infection, fever, chills, redness, swelling, increasing pain, or discharge at the incision site. Nausea not relieved by medication, frequent vomiting and not able to keep anything down, and  excessive diarrhea (more than 8 episodes in 24 hours). Pain, burning, urgency, or frequency of urination or blood in the urine. Pain and/or swelling in your feet, calves, or legs. You have pain that does not get better after taking pain medication   You cannot pass stool or gas.    You are sick to your stomach and cannot drink fluids. You have loose stitches, or your incision comes open. You have signs of a blood clot such as:  - pain in your calf, back of the knee, thigh, or groin. - redness and swelling in your leg or groin   If you feel dehydrated, call the office to have IV fluids ordered. Signs of dehydration include dizziness, tiredness, dry mouth, dry lips, decreased urine output, dark colored urine, headache, feeling thirsty and muscle cramps.  This can occur if you are not meeting your daily fluid intake goal.   Watch closely for changes in your health, and be sure to contact your doctor if you have any problems    FOR ANY OF THE FOLLOWING, GO TO THE ER:   SHORTNESS  W Vernell Solomon      IN CASE OF EMERGENCY, CALL 911 IMMEDIATELY  Lisa melgar

## 2022-10-18 NOTE — ANESTHESIA PRE PROCEDURE
Department of Anesthesiology  Preprocedure Note       Name:  Danny Martinez   Age:  58 y.o.  :  1960                                          MRN:  [id number]         Date:  10/18/2022      Surgeon: Jovany Johnson):  Pernell Parikh MD    Procedure: Procedure(s):  GASTRIC BYPASS CATHI-EN-Y LAPAROSCOPIC ROBOTIC XI    Medications prior to admission:   Prior to Admission medications    Medication Sig Start Date End Date Taking?  Authorizing Provider   Fluticasone Furoate-Vilanterol (BREO ELLIPTA IN) Inhale into the lungs   Yes Historical Provider, MD   omeprazole (PRILOSEC) 20 MG delayed release capsule Take 1 capsule by mouth Daily 22  Pernell Parikh MD   ondansetron (ZOFRAN-ODT) 4 MG disintegrating tablet Take 1 tablet by mouth every 8 hours as needed for Nausea or Vomiting 22   Pernell Parikh MD   albuterol (PROVENTIL) (2.5 MG/3ML) 0.083% nebulizer solution Take 3 mLs by nebulization every 6 hours as needed for Wheezing 22   Myah Smith MD   albuterol sulfate  (90 Base) MCG/ACT inhaler Inhale 2 puffs into the lungs every 6 hours as needed for Wheezing 21   Myah Smith MD   rosuvastatin (CRESTOR) 10 MG tablet Take 1 tablet by mouth daily 21   Kehinde Myrick MD   Famotidine (PEPCID PO) Take 1 tablet by mouth daily    Historical Provider, MD   Ascorbic Acid (VITAMIN C) 1000 MG tablet Take 1,000 mg by mouth daily     Historical Provider, MD   valACYclovir (VALTREX) 1 g tablet Take 1 g by mouth as needed 21   Historical Provider, MD   Cholecalciferol (VITAMIN D) 2000 units CAPS capsule Take 1 capsule by mouth daily     Historical Provider, MD   aspirin 81 MG tablet Take 81 mg by mouth daily    Historical Provider, MD   levothyroxine (SYNTHROID) 150 MCG tablet Take 150 mcg by mouth Daily    Historical Provider, MD   metFORMIN (GLUCOPHAGE) 1000 MG tablet Take 1,000 mg by mouth 2 times daily (with meals)    Historical Provider, MD       Current medications: Current Facility-Administered Medications   Medication Dose Route Frequency Provider Last Rate Last Admin    0.9 % sodium chloride infusion   IntraVENous Continuous Michael Cotter MD        sodium chloride flush 0.9 % injection 5-40 mL  5-40 mL IntraVENous 2 times per day Michael Cotter MD        sodium chloride flush 0.9 % injection 5-40 mL  5-40 mL IntraVENous PRN Michael Cotter MD        0.9 % sodium chloride infusion   IntraVENous PRN Michael Cotter MD        scopolamine (TRANSDERM-SCOP) transdermal patch 1 patch  1 patch TransDERmal Q72H Michael Cotter MD   1 patch at 10/18/22 0645    cefOXitin (MEFOXIN) 2,000 mg in sodium chloride 0.9 % 50 mL IVPB ([id number])  2,000 mg IntraVENous On Call to Albert Blake MD        0.9 % sodium chloride infusion   IntraVENous Continuous Anu Rodney MD 50 mL/hr at 10/18/22 0642 New Bag at 10/18/22 9453       Allergies: Allergies   Allergen Reactions    Provigil [Modafinil] Dermatitis     Severe dermatitis       Problem List:    Patient Active Problem List   Diagnosis Code    SANJAY treated with BiPAP G47.33    Sarcoidosis of lung ([facility] D86.0    SOB (shortness of breath) R06.02    CAD in native artery I25.10    S/P gastric bypass Z98.84       Past Medical History:        Diagnosis Date    Hyperlipidemia     Hyperthyroidism     Kidney stone     Morbid obesity due to excess calories ([facility]     Sarcoidosis     Unspecified sleep apnea        Past Surgical History:        Procedure Laterality Date    CARPAL TUNNEL RELEASE Bilateral     HERNIA REPAIR  08/2021    KNEE SURGERY      x2 last 2/2015    TONSILLECTOMY      UPPER GASTROINTESTINAL ENDOSCOPY  04/29/2022       Social History:    Social History     Tobacco Use    Smoking status: Never    Smokeless tobacco: Never   Substance Use Topics    Alcohol use:  Yes     Alcohol/week: 0.0 standard drinks     Comment: socially                                Counseling given: Not Answered      Vital Signs (Current):   Vitals:    10/18/22 0611 10/18/22 0615   BP:  (!) 127/58   Pulse:  78   Resp:  18   Temp:  98 °F (36.7 °C)   TempSrc:  Infrared   SpO2:  94%   Weight: (!) 301 lb (136.5 kg)    Height: 6' (1.829 m)                                               BP Readings from Last 3 Encounters:   10/18/22 (!) 127/58   10/06/22 139/76   09/30/22 (!) 167/75       NPO Status: Time of last liquid consumption: 1900                        Time of last solid consumption: 1900                        Date of last liquid consumption: 10/17/22                        Date of last solid food consumption: 10/17/22    BMI:   Wt Readings from Last 3 Encounters:   10/18/22 (!) 301 lb (136.5 kg)   10/06/22 (!) 300 lb 12.8 oz (136.4 kg)   09/30/22 (!) 308 lb (139.7 kg)     Body mass index is 40.82 kg/m². CBC:   Lab Results   Component Value Date/Time    WBC 5.3 10/06/2022 08:10 AM    RBC 4.99 10/06/2022 08:10 AM    HGB 13.8 10/06/2022 08:10 AM    HCT 41.9 10/06/2022 08:10 AM    MCV 84.0 10/06/2022 08:10 AM    RDW 14.3 10/06/2022 08:10 AM     10/06/2022 08:10 AM       CMP:   Lab Results   Component Value Date/Time     10/06/2022 08:10 AM    K 4.0 10/06/2022 08:10 AM     10/06/2022 08:10 AM    CO2 26 10/06/2022 08:10 AM    BUN 13 10/06/2022 08:10 AM    CREATININE 1.1 10/06/2022 08:10 AM    GFRAA >60 10/06/2022 08:10 AM    LABGLOM >60 10/06/2022 08:10 AM    GLUCOSE 122 10/06/2022 08:10 AM    PROT 7.4 10/06/2022 08:10 AM    CALCIUM 9.4 10/06/2022 08:10 AM    BILITOT 0.7 10/06/2022 08:10 AM    ALKPHOS 95 10/06/2022 08:10 AM    AST 18 10/06/2022 08:10 AM    ALT 20 10/06/2022 08:10 AM       POC Tests: No results for input(s): POCGLU, POCNA, POCK, POCCL, POCBUN, POCHEMO, POCHCT in the last 72 hours.     Coags:   Lab Results   Component Value Date/Time    PROTIME 11.0 10/26/2021 10:42 AM    INR 0.9 10/26/2021 10:42 AM       HCG (If Applicable): No results found for: PREGTESTUR, PREGSERUM, HCG, HCGQUANT     ABGs: No results found for: PHART, PO2ART, KYL6QOR, WLK6ZYQ, BEART, K9EATYCL     Type & Screen (If Applicable):  No results found for: LABABO, LABRH    Drug/Infectious Status (If Applicable):  No results found for: HIV, HEPCAB    COVID-19 Screening (If Applicable):   Lab Results   Component Value Date/Time    COVID19 Not Detected 04/02/2022 11:07 PM           Anesthesia Evaluation  Patient summary reviewed  Airway: Mallampati: III  TM distance: >3 FB   Neck ROM: full  Mouth opening: > = 3 FB   Dental: normal exam         Pulmonary:   (+) shortness of breath:  sleep apnea:                             Cardiovascular:    (+) CAD:,         Rhythm: regular  Rate: normal                    Neuro/Psych:               GI/Hepatic/Renal:   (+) morbid obesity          Endo/Other:    (+) hyperthyroidism::., .                 Abdominal:   (+) obese,     Abdomen: soft. Vascular: Other Findings:           Anesthesia Plan      general     ASA 3       Induction: intravenous. Anesthetic plan and risks discussed with patient. Plan discussed with CRNA.                     Arslan Fagan MD   10/18/2022

## 2022-10-19 ENCOUNTER — TELEPHONE (OUTPATIENT)
Dept: BARIATRICS/WEIGHT MGMT | Age: 62
End: 2022-10-19

## 2022-10-19 VITALS
OXYGEN SATURATION: 96 % | RESPIRATION RATE: 16 BRPM | DIASTOLIC BLOOD PRESSURE: 72 MMHG | TEMPERATURE: 98.1 F | SYSTOLIC BLOOD PRESSURE: 130 MMHG | HEART RATE: 65 BPM | WEIGHT: 301 LBS | HEIGHT: 72 IN | BODY MASS INDEX: 40.77 KG/M2

## 2022-10-19 LAB
ALBUMIN SERPL-MCNC: 3.6 G/DL (ref 3.5–5.2)
ALP BLD-CCNC: 68 U/L (ref 40–129)
ALT SERPL-CCNC: 21 U/L (ref 0–40)
ANION GAP SERPL CALCULATED.3IONS-SCNC: 9 MMOL/L (ref 7–16)
AST SERPL-CCNC: 16 U/L (ref 0–39)
BASOPHILS ABSOLUTE: 0.01 E9/L (ref 0–0.2)
BASOPHILS RELATIVE PERCENT: 0.1 % (ref 0–2)
BILIRUB SERPL-MCNC: 0.5 MG/DL (ref 0–1.2)
BILIRUBIN DIRECT: <0.2 MG/DL (ref 0–0.3)
BILIRUBIN, INDIRECT: ABNORMAL MG/DL (ref 0–1)
BUN BLDV-MCNC: 11 MG/DL (ref 6–23)
CALCIUM SERPL-MCNC: 8.7 MG/DL (ref 8.6–10.2)
CHLORIDE BLD-SCNC: 103 MMOL/L (ref 98–107)
CHOLESTEROL, TOTAL: 131 MG/DL (ref 0–199)
CO2: 24 MMOL/L (ref 22–29)
CREAT SERPL-MCNC: 1 MG/DL (ref 0.7–1.2)
EOSINOPHILS ABSOLUTE: 0 E9/L (ref 0.05–0.5)
EOSINOPHILS RELATIVE PERCENT: 0 % (ref 0–6)
GFR SERPL CREATININE-BSD FRML MDRD: >60 ML/MIN/1.73
GLUCOSE BLD-MCNC: 112 MG/DL (ref 74–99)
HCT VFR BLD CALC: 36.4 % (ref 37–54)
HDLC SERPL-MCNC: 36 MG/DL
HEMOGLOBIN: 12.1 G/DL (ref 12.5–16.5)
IMMATURE GRANULOCYTES #: 0.07 E9/L
IMMATURE GRANULOCYTES %: 0.6 % (ref 0–5)
LDL CHOLESTEROL CALCULATED: 75 MG/DL (ref 0–99)
LYMPHOCYTES ABSOLUTE: 1.17 E9/L (ref 1.5–4)
LYMPHOCYTES RELATIVE PERCENT: 9.7 % (ref 20–42)
MAGNESIUM: 1.8 MG/DL (ref 1.6–2.6)
MCH RBC QN AUTO: 28.9 PG (ref 26–35)
MCHC RBC AUTO-ENTMCNC: 33.2 % (ref 32–34.5)
MCV RBC AUTO: 86.9 FL (ref 80–99.9)
METER GLUCOSE: 114 MG/DL (ref 74–99)
MONOCYTES ABSOLUTE: 0.83 E9/L (ref 0.1–0.95)
MONOCYTES RELATIVE PERCENT: 6.9 % (ref 2–12)
NEUTROPHILS ABSOLUTE: 10 E9/L (ref 1.8–7.3)
NEUTROPHILS RELATIVE PERCENT: 82.7 % (ref 43–80)
PDW BLD-RTO: 14.5 FL (ref 11.5–15)
PHOSPHORUS: 3.3 MG/DL (ref 2.5–4.5)
PLATELET # BLD: 249 E9/L (ref 130–450)
PMV BLD AUTO: 9.9 FL (ref 7–12)
POTASSIUM SERPL-SCNC: 4 MMOL/L (ref 3.5–5)
RBC # BLD: 4.19 E12/L (ref 3.8–5.8)
SODIUM BLD-SCNC: 136 MMOL/L (ref 132–146)
T4 FREE: 1.15 NG/DL (ref 0.93–1.7)
TOTAL PROTEIN: 6.1 G/DL (ref 6.4–8.3)
TRIGL SERPL-MCNC: 98 MG/DL (ref 0–149)
TSH SERPL DL<=0.05 MIU/L-ACNC: 0.57 UIU/ML (ref 0.27–4.2)
VLDLC SERPL CALC-MCNC: 20 MG/DL
WBC # BLD: 12.1 E9/L (ref 4.5–11.5)

## 2022-10-19 PROCEDURE — 84443 ASSAY THYROID STIM HORMONE: CPT

## 2022-10-19 PROCEDURE — 6360000002 HC RX W HCPCS: Performed by: SURGERY

## 2022-10-19 PROCEDURE — 6370000000 HC RX 637 (ALT 250 FOR IP): Performed by: SURGERY

## 2022-10-19 PROCEDURE — 80048 BASIC METABOLIC PNL TOTAL CA: CPT

## 2022-10-19 PROCEDURE — C9113 INJ PANTOPRAZOLE SODIUM, VIA: HCPCS | Performed by: SURGERY

## 2022-10-19 PROCEDURE — 84439 ASSAY OF FREE THYROXINE: CPT

## 2022-10-19 PROCEDURE — 84100 ASSAY OF PHOSPHORUS: CPT

## 2022-10-19 PROCEDURE — A4216 STERILE WATER/SALINE, 10 ML: HCPCS | Performed by: SURGERY

## 2022-10-19 PROCEDURE — 6370000000 HC RX 637 (ALT 250 FOR IP): Performed by: NURSE PRACTITIONER

## 2022-10-19 PROCEDURE — 36415 COLL VENOUS BLD VENIPUNCTURE: CPT

## 2022-10-19 PROCEDURE — 94640 AIRWAY INHALATION TREATMENT: CPT

## 2022-10-19 PROCEDURE — 82962 GLUCOSE BLOOD TEST: CPT

## 2022-10-19 PROCEDURE — 80076 HEPATIC FUNCTION PANEL: CPT

## 2022-10-19 PROCEDURE — 80061 LIPID PANEL: CPT

## 2022-10-19 PROCEDURE — 83735 ASSAY OF MAGNESIUM: CPT

## 2022-10-19 PROCEDURE — 6360000002 HC RX W HCPCS: Performed by: NURSE PRACTITIONER

## 2022-10-19 PROCEDURE — 85025 COMPLETE CBC W/AUTO DIFF WBC: CPT

## 2022-10-19 PROCEDURE — 2580000003 HC RX 258: Performed by: SURGERY

## 2022-10-19 RX ADMIN — OXYCODONE 5 MG: 5 TABLET ORAL at 06:06

## 2022-10-19 RX ADMIN — SODIUM CHLORIDE, PRESERVATIVE FREE 10 ML: 5 INJECTION INTRAVENOUS at 10:26

## 2022-10-19 RX ADMIN — ARFORMOTEROL TARTRATE 15 MCG: 15 SOLUTION RESPIRATORY (INHALATION) at 06:48

## 2022-10-19 RX ADMIN — BUDESONIDE 500 MCG: 0.5 SUSPENSION RESPIRATORY (INHALATION) at 06:48

## 2022-10-19 RX ADMIN — ENOXAPARIN SODIUM 40 MG: 100 INJECTION SUBCUTANEOUS at 10:23

## 2022-10-19 RX ADMIN — ACETAMINOPHEN 650 MG: 160 SUSPENSION ORAL at 06:07

## 2022-10-19 RX ADMIN — SODIUM CHLORIDE 40 MG: 9 INJECTION, SOLUTION INTRAMUSCULAR; INTRAVENOUS; SUBCUTANEOUS at 10:24

## 2022-10-19 RX ADMIN — LEVOTHYROXINE SODIUM 150 MCG: 0.15 TABLET ORAL at 06:06

## 2022-10-19 ASSESSMENT — PAIN DESCRIPTION - LOCATION: LOCATION: ABDOMEN

## 2022-10-19 ASSESSMENT — PAIN SCALES - GENERAL
PAINLEVEL_OUTOF10: 0
PAINLEVEL_OUTOF10: 6

## 2022-10-19 ASSESSMENT — PAIN DESCRIPTION - DESCRIPTORS: DESCRIPTORS: DISCOMFORT;SORE

## 2022-10-19 NOTE — TELEPHONE ENCOUNTER
[address] [facility]        [facility] Center       Discharge Review for     Lisa-en-Y Gastric Bypass                    And         Sleeve Gastrectomy     Reviewed with patient the following for discharge home:    Incision care- yes  Walking- yes  Elevate Head of Bed- yes  Infection Control- yes  Post op medications (PPI, Prilosec and medication for nausea, Zofran) - yes  Review of Clear Liquid Diet- yes  Review of Full Liquid Diet- yes  Use of Emergency Room- yes  How and When to Call Bariatric Office- yes  Reminder of PCP Follow Up Appointment and Lab Draw- yes  Discuss Post-Op Call Schedule- yes  Any other issues- yes    Completed by Lee Ann Sanford RN

## 2022-10-19 NOTE — CARE COORDINATION
10-19-Cm note: met with pt for transiton of care, his wife is at the bedside, no needs expressed for homegoing, wife will transport . Electronically signed by Dulce Reyes RN on 10/19/2022 at 10:04 AM

## 2022-10-19 NOTE — PROGRESS NOTES
Internal Medicine Progress Note    [facility]    Maritza Piper. Joseph Urias., F.AHALOWojciechI. Donovan George D.O., SANYAOWojciechISTEVAN Caldwell/ Brittny Michael [address], D.O. Jose Matson, MSN, APRN, NP-C  Gregorio Alejandro. Ina Judge, MSN, APRN-CNP       Primary Care Physician: Louvella Lennox, MD   Admitting Physician:  Stephany Jones MD  Admission date and time: 10/18/2022  6:02 AM    Room:  [address] [place] [address]  Admitting diagnosis: Morbid obesity ([facility] [address] [E66.01]  S/P gastric bypass [Z98.84]    Patient Name: Khushi Mora  MRN: [id number]    Date of Service: 10/19/2022     Subjective:  Ben Meehan is a 58 y.o. male who was seen and examined today,10/19/2022, at the bedside. Patient doing well today. Tolerating diet. Plan for discharge today. Discussed condition with surgery    Wife was present during my examination. Review of System:   Constitutional:   Denies fever or chills, weight loss or gain, fatigue or malaise. HEENT:   Denies ear pain, sore throat, sinus or eye problems. Cardiovascular:   Denies any chest pain, irregular heartbeats, or palpitations. Respiratory:   Denies shortness of breath, coughing, sputum production, hemoptysis, or wheezing. Gastrointestinal:   Denies nausea, vomiting, diarrhea, or constipation. Denies any abdominal pain. Genitourinary:    Denies any urgency, frequency, hematuria. Voiding  without difficulty. Extremities:   Denies lower extremity swelling, edema or cyanosis. Neurology:    Denies any headache or focal neurological deficits, Denies generalized weakness or memory difficulty. Psch:   Denies being anxious or depressed. Musculoskeletal:    Denies  myalgias, joint complaints or back pain. Integumentary:   Denies any rashes, ulcers, or excoriations. Denies bruising. Hematologic/Lymphatic:  Denies bruising or bleeding.     Physical Exam:  I/O this shift:  In: 300 [P.O.:300]  Out: 400 [Urine:400]    Intake/Output Summary (Last 24 hours) at 10/19/2022 1420  Last data filed at 10/19/2022 0848  Gross per 24 hour   Intake 3870.39 ml   Output 1700 ml   Net 2170.39 ml   I/O last 3 completed shifts: In: 5260.4 [P.O.:1360; I.V.:3850.4; IV Piggyback:50]  Out: 0494 [Urine:1300; Blood:3]  Patient Vitals for the past 96 hrs (Last 3 readings):   Weight   10/18/22 0611 (!) 301 lb (136.5 kg)     Vital Signs:   Blood pressure 130/72, pulse 65, temperature 98.1 °F (36.7 °C), temperature source Oral, resp. rate 16, height 6' (1.829 m), weight (!) 301 lb (136.5 kg), SpO2 96 %. General appearance:  Alert, responsive, oriented to person, place, and time. Well preserved, alert, no distress. Head:  Normocephalic. No masses, lesions or tenderness. Eyes:  PERRLA. EOMI. Sclera clear. Buccal mucosa moist.  ENT:  Ears normal. Mucosa normal.  Neck:    Supple. Trachea midline. No thyromegaly. No JVD. No bruits. Heart:    Rhythm regular. Rate controlled. No murmurs. Lungs:    Symmetrical. Clear to auscultation bilaterally. No wheezes. No rhonchi. No rales. Abdomen:   Soft. Non-tender. Non-distended. Bowel sounds positive. No organomegaly or masses. Minimal postsurgical pain  Extremities:    Peripheral pulses present. No peripheral edema. No ulcers. No cyanosis. No clubbing. Neurologic:    Alert x 3. No focal deficit. Cranial nerves grossly intact. No focal weakness. Psych:   Behavior is normal. Mood appears normal. Speech is not rapid and/or pressured. Musculoskeletal:   Spine ROM normal. Muscular strength intact. Gait not assessed. Integumentary:  No rashes  Skin normal color and texture.   Genitalia/Breast:  Deferred    Medication:  Scheduled Meds:   sodium chloride flush  5-40 mL IntraVENous 2 times per day    acetaminophen  650 mg Oral Q6H    [START ON 10/21/2022] scopolamine  1 patch TransDERmal Q72H    enoxaparin  40 mg SubCUTAneous 2 times per day    pantoprazole (PROTONIX) 40 mg injection  40 mg IntraVENous Daily    levothyroxine  150 mcg Oral Daily    rosuvastatin  10 mg Oral Nightly    budesonide  0.5 mg Nebulization BID    Arformoterol Tartrate  15 mcg Nebulization BID    insulin lispro  0-4 Units SubCUTAneous TID WC    insulin lispro  0-4 Units SubCUTAneous Nightly     Continuous Infusions:   sodium chloride 50 mL/hr at 10/18/22 0642    lactated ringers 125 mL/hr at 10/19/22 1040    sodium chloride      dextrose         Objective Data:  Recent Labs     10/19/22  0458   WBC 12.1*   RBC 4.19   HGB 12.1*   HCT 36.4*   MCV 86.9   MCH 28.9   MCHC 33.2   RDW 14.5      MPV 9.9     Lab Results   Component Value Date/Time     10/19/2022 04:58 AM    K 4.0 10/19/2022 04:58 AM    K 4.0 10/06/2022 08:10 AM     10/19/2022 04:58 AM    CO2 24 10/19/2022 04:58 AM    ANIONGAP 9 10/19/2022 04:58 AM    GLUCOSE 112 10/19/2022 04:58 AM    BUN 11 10/19/2022 04:58 AM    CREATININE 1.0 10/19/2022 04:58 AM    GFRAA >60 10/06/2022 08:10 AM    LABGLOM >60 10/19/2022 04:58 AM    CALCIUM 8.7 10/19/2022 04:58 AM    BILITOT 0.5 10/19/2022 04:58 AM    ALT 21 10/19/2022 04:58 AM    AST 16 10/19/2022 04:58 AM    ALKPHOS 68 10/19/2022 04:58 AM    PROT 6.1 10/19/2022 04:58 AM    LABALBU 3.6 10/19/2022 04:58 AM     Recent Labs     10/19/22  0458   MG 1.8      Lab Results   Component Value Date/Time    PHOS 3.3 10/19/2022 04:58 AM     No results for input(s): TROPONINI in the last 72 hours.          Assessment:    Morbid obesity with BMI 40.82 kg per metered squared status post Lisa-en-Y gastric bypass October 18, 2022 by Dr. Prema Gonzalez  Pulmonary sarcoidosis following Rosezetta Laser as an outpatient on conservative medical management  Obstructive sleep apnea secondary to morbid obesity on chronic BiPAP  Non-insulin-dependent diabetes mellitus type 2   Hypothyroidism  Hyperlipidemia      Plan:     Patient doing well postoperatively  Denies chest pain or shortness of breath  Lab work appears stable  Discharge today by  Salazar  Discussed home-going medication and plan of treatment      More than 50% of my time was spent at the bedside counseling/coordinating care with the patient and/or family with face to face contact. This time was spent reviewing notes and laboratory data as well as instructing and counseling the patient. Time I spent with the family or surrogate(s) is included only if the patient was incapable of providing the necessary information or participating in medical decisions. I also discussed the differential diagnosis and all of the proposed management plans with the patient and individuals accompanying the patient. I am readily available for any further decision-making and intervention.        Brandon Cisneros [address], DO, F.A.C.O.I.  10/19/2022  2:20 PM

## 2022-10-19 NOTE — DISCHARGE SUMMARY
Physician Discharge Summary     Patient ID:  Jaswant Segura  [id number]  5 y.o.  1960    Admit date: 10/18/2022    Discharge date and time: 10/19/2022    Admitting Physician: Ericka Lara MD     Admission Diagnoses:   Patient Active Problem List   Diagnosis    SANJAY treated with BiPAP    Sarcoidosis of lung (HCC)    SOB (shortness of breath)    CAD in native artery    S/P gastric bypass    Morbid obesity ([facility] [address]       Discharge Diagnoses:   Patient Active Problem List   Diagnosis    SANJAY treated with BiPAP    Sarcoidosis of lung ([facility] [address]    SOB (shortness of breath)    CAD in native artery    S/P gastric bypass    Morbid obesity ([facility] [address]       Admission Condition: good    Discharged Condition: good    Indication for Admission: Morbid obesity, DM    Hospital Course: Jaswant Segura is a 58 y.o. male who was admitted with morbid obesity and hypertension, including multiple other comorbidities. Patient underwent laparoscopic  Lisa-en-Y gastric bypass without complication. They did well postoperatively, diet was advanced, they were ambulating independently and pain was controlled. They were discharged with appropriate medication, instructions and follow up.      Consults: Hospitalist    Significant Diagnostic Studies: labs    Treatments: IV hydration, antibiotics: Ancef, analgesia: IV narcotic, tylenol, toradol and oral narcotics and surgery: Laparoscopic Lisa-en-Y GB    In process/preliminary results:  Outstanding Order Results       Date and Time Order Name Status Description    10/18/2022 12:00 AM Surgical Pathology In process             Patient Instructions:   Current Discharge Medication List        CONTINUE these medications which have NOT CHANGED    Details   Fluticasone Furoate-Vilanterol (BREO ELLIPTA IN) Inhale into the lungs      omeprazole (PRILOSEC) 20 MG delayed release capsule Take 1 capsule by mouth Daily  Qty: 30 capsule, Refills: 12    Associated Diagnoses: Gastroesophageal reflux disease without esophagitis ondansetron (ZOFRAN-ODT) 4 MG disintegrating tablet Take 1 tablet by mouth every 8 hours as needed for Nausea or Vomiting  Qty: 15 tablet, Refills: 0    Associated Diagnoses: PONV (postoperative nausea and vomiting)      albuterol (PROVENTIL) (2.5 MG/3ML) 0.083% nebulizer solution Take 3 mLs by nebulization every 6 hours as needed for Wheezing  Qty: 120 each, Refills: 3    Associated Diagnoses: Moderate persistent asthma with acute exacerbation; Wheezing      albuterol sulfate  (90 Base) MCG/ACT inhaler Inhale 2 puffs into the lungs every 6 hours as needed for Wheezing  Qty: 18 g, Refills: 3    Associated Diagnoses: SOB (shortness of breath)      Ascorbic Acid (VITAMIN C) 1000 MG tablet Take 1,000 mg by mouth daily       valACYclovir (VALTREX) 1 g tablet Take 1 g by mouth as needed      Cholecalciferol (VITAMIN D) 2000 units CAPS capsule Take 1 capsule by mouth daily       aspirin 81 MG tablet Take 81 mg by mouth daily      levothyroxine (SYNTHROID) 150 MCG tablet Take 150 mcg by mouth Daily           STOP taking these medications       rosuvastatin (CRESTOR) 10 MG tablet Comments:   Reason for Stopping:         Famotidine (PEPCID PO) Comments:   Reason for Stopping:         metFORMIN (GLUCOPHAGE) 1000 MG tablet Comments:   Reason for Stopping:               Discharge Exam:  General appearance: AAOx3, NAD  Head: NCAT, PERRLA, EOMI, red conjunctiva  Neck: supple, no masses  Lungs: Equal chest rise bilateral  Heart: Reg rate  Abdomen: soft, nondistended, tender appropriately, normotympanic, no guarding, no peritoneal signs, incision C/D/I  Extremities: extremities normal, atraumatic, no cyanosis or edema    Disposition: home    Patient Instructions: Activity: no lifting, Driving, or Strenuous exercise for 2 weeks  Diet: Bariatric Clears  Wound Care: keep wound clean and dry    Follow-up with Dr Shruthi Villegas in weight loss center in 2 weeks.       Signed:  Ulla Mortimer, MD  10/19/2022  9:09 AM

## 2022-10-20 ENCOUNTER — TELEPHONE (OUTPATIENT)
Dept: BARIATRICS/WEIGHT MGMT | Age: 62
End: 2022-10-20

## 2022-10-20 NOTE — TELEPHONE ENCOUNTER
Post op questions:    Nausea/vomiting present (YES/NO)no:    Fever > 101/chills present (YES/NO):no    Tolerating liquids (YES/NO):yes  Ounces per day: 64  Protein shakes:no    Taking post op medications, Carafate/Omeprazole & Zofran (YES/NO): yes    Is patient up and walking (YES/NO):yes    Having bowel movements (YES/NO):no    Are incisions healing well (YES/NO):yes    Having any problems or concerns that need to be addressed:  none

## 2022-10-21 ENCOUNTER — TELEPHONE (OUTPATIENT)
Dept: BARIATRICS/WEIGHT MGMT | Age: 62
End: 2022-10-21

## 2022-10-21 NOTE — TELEPHONE ENCOUNTER
Received message from patient regarding nausea medication. Attempted several times to call patient back and received a busy signal.  Patient was sent home with Zofran for nausea.

## 2022-10-21 NOTE — TELEPHONE ENCOUNTER
Received call from patient with some questions. Patient states that he had issues today with taking in the protein shake. I instructed him to slow his intake of the protein and try a little longer like over 30 minutes for each ounce. Patient also states that he noticed yesterday that his hand where the IV was at in the hospital is swollen. It is not red or hot. I instructed patient to apply ice and to keep hand elevated when sitting. Patient with questions about Prilosec and Zofran- reviewed these with patient. No further questions at this time.

## 2022-10-24 ENCOUNTER — TELEPHONE (OUTPATIENT)
Dept: BARIATRICS/WEIGHT MGMT | Age: 62
End: 2022-10-24

## 2022-10-24 ENCOUNTER — HOSPITAL ENCOUNTER (OUTPATIENT)
Age: 62
Discharge: HOME | End: 2022-10-24
Payer: COMMERCIAL

## 2022-10-24 DIAGNOSIS — K91.2 MALNUTRITION FOLLOWING GASTROINTESTINAL SURGERY: ICD-10-CM

## 2022-10-24 LAB
ALBUMIN SERPL-MCNC: 4.2 G/DL (ref 3.5–5.2)
ALP BLD-CCNC: 85 U/L (ref 40–129)
ALT SERPL-CCNC: 22 U/L (ref 0–40)
ANION GAP SERPL CALCULATED.3IONS-SCNC: 12 MMOL/L (ref 7–16)
AST SERPL-CCNC: 18 U/L (ref 0–39)
BILIRUB SERPL-MCNC: 0.9 MG/DL (ref 0–1.2)
BUN BLDV-MCNC: 13 MG/DL (ref 6–23)
CALCIUM SERPL-MCNC: 9.4 MG/DL (ref 8.6–10.2)
CHLORIDE BLD-SCNC: 101 MMOL/L (ref 98–107)
CO2: 25 MMOL/L (ref 22–29)
CREAT SERPL-MCNC: 1.1 MG/DL (ref 0.7–1.2)
GFR SERPL CREATININE-BSD FRML MDRD: >60 ML/MIN/1.73
GLUCOSE BLD-MCNC: 86 MG/DL (ref 74–99)
HCT VFR BLD CALC: 41.7 % (ref 37–54)
HEMOGLOBIN: 13.4 G/DL (ref 12.5–16.5)
MCH RBC QN AUTO: 27.7 PG (ref 26–35)
MCHC RBC AUTO-ENTMCNC: 32.1 % (ref 32–34.5)
MCV RBC AUTO: 86.3 FL (ref 80–99.9)
PDW BLD-RTO: 14 FL (ref 11.5–15)
PLATELET # BLD: 290 E9/L (ref 130–450)
PMV BLD AUTO: 9.2 FL (ref 7–12)
POTASSIUM SERPL-SCNC: 4.5 MMOL/L (ref 3.5–5)
RBC # BLD: 4.83 E12/L (ref 3.8–5.8)
SODIUM BLD-SCNC: 138 MMOL/L (ref 132–146)
TOTAL PROTEIN: 7.1 G/DL (ref 6.4–8.3)
WBC # BLD: 8.3 E9/L (ref 4.5–11.5)

## 2022-10-24 PROCEDURE — 80053 COMPREHEN METABOLIC PANEL: CPT

## 2022-10-24 PROCEDURE — 36415 COLL VENOUS BLD VENIPUNCTURE: CPT

## 2022-10-24 PROCEDURE — 85027 COMPLETE CBC AUTOMATED: CPT

## 2022-10-26 ENCOUNTER — HOSPITAL ENCOUNTER (EMERGENCY)
Age: 62
Discharge: HOME | End: 2022-10-26
Attending: EMERGENCY MEDICINE
Payer: COMMERCIAL

## 2022-10-26 ENCOUNTER — TELEPHONE (OUTPATIENT)
Dept: BARIATRICS/WEIGHT MGMT | Age: 62
End: 2022-10-26

## 2022-10-26 ENCOUNTER — APPOINTMENT (OUTPATIENT)
Dept: CT IMAGING | Age: 62
End: 2022-10-26
Payer: COMMERCIAL

## 2022-10-26 VITALS
HEART RATE: 75 BPM | DIASTOLIC BLOOD PRESSURE: 91 MMHG | HEIGHT: 72 IN | BODY MASS INDEX: 39.28 KG/M2 | OXYGEN SATURATION: 92 % | WEIGHT: 290 LBS | SYSTOLIC BLOOD PRESSURE: 137 MMHG | TEMPERATURE: 97.5 F | RESPIRATION RATE: 12 BRPM

## 2022-10-26 DIAGNOSIS — T78.40XA ALLERGIC REACTION, INITIAL ENCOUNTER: Primary | ICD-10-CM

## 2022-10-26 DIAGNOSIS — R55 VASOVAGAL SYNCOPE: ICD-10-CM

## 2022-10-26 DIAGNOSIS — K44.9 HIATAL HERNIA: ICD-10-CM

## 2022-10-26 LAB
ABO/RH: NORMAL
ALBUMIN SERPL-MCNC: 3.8 G/DL (ref 3.5–5.2)
ALP BLD-CCNC: 79 U/L (ref 40–129)
ALT SERPL-CCNC: 25 U/L (ref 0–40)
ANION GAP SERPL CALCULATED.3IONS-SCNC: 15 MMOL/L (ref 7–16)
ANTIBODY SCREEN: NORMAL
AST SERPL-CCNC: 20 U/L (ref 0–39)
BASOPHILS ABSOLUTE: 0.05 E9/L (ref 0–0.2)
BASOPHILS RELATIVE PERCENT: 0.3 % (ref 0–2)
BILIRUB SERPL-MCNC: 1.1 MG/DL (ref 0–1.2)
BUN BLDV-MCNC: 26 MG/DL (ref 6–23)
CALCIUM SERPL-MCNC: 9.5 MG/DL (ref 8.6–10.2)
CHLORIDE BLD-SCNC: 100 MMOL/L (ref 98–107)
CO2: 17 MMOL/L (ref 22–29)
CREAT SERPL-MCNC: 1.3 MG/DL (ref 0.7–1.2)
EOSINOPHILS ABSOLUTE: 0.09 E9/L (ref 0.05–0.5)
EOSINOPHILS RELATIVE PERCENT: 0.5 % (ref 0–6)
GFR SERPL CREATININE-BSD FRML MDRD: >60 ML/MIN/1.73
GLUCOSE BLD-MCNC: 123 MG/DL (ref 74–99)
HCT VFR BLD CALC: 49 % (ref 37–54)
HEMOGLOBIN: 16.6 G/DL (ref 12.5–16.5)
IMMATURE GRANULOCYTES #: 0.11 E9/L
IMMATURE GRANULOCYTES %: 0.6 % (ref 0–5)
LACTIC ACID: 1.9 MMOL/L (ref 0.5–2.2)
LYMPHOCYTES ABSOLUTE: 2.15 E9/L (ref 1.5–4)
LYMPHOCYTES RELATIVE PERCENT: 12.2 % (ref 20–42)
MCH RBC QN AUTO: 28.1 PG (ref 26–35)
MCHC RBC AUTO-ENTMCNC: 33.9 % (ref 32–34.5)
MCV RBC AUTO: 83.1 FL (ref 80–99.9)
METER GLUCOSE: 116 MG/DL (ref 74–99)
MONOCYTES ABSOLUTE: 1.35 E9/L (ref 0.1–0.95)
MONOCYTES RELATIVE PERCENT: 7.6 % (ref 2–12)
NEUTROPHILS ABSOLUTE: 13.9 E9/L (ref 1.8–7.3)
NEUTROPHILS RELATIVE PERCENT: 78.8 % (ref 43–80)
PDW BLD-RTO: 14.5 FL (ref 11.5–15)
PLATELET # BLD: 398 E9/L (ref 130–450)
PMV BLD AUTO: 9.6 FL (ref 7–12)
POTASSIUM SERPL-SCNC: 4.3 MMOL/L (ref 3.5–5)
PRO-BNP: 28 PG/ML (ref 0–125)
RBC # BLD: 5.9 E12/L (ref 3.8–5.8)
SODIUM BLD-SCNC: 132 MMOL/L (ref 132–146)
TOTAL PROTEIN: 7.1 G/DL (ref 6.4–8.3)
TROPONIN, HIGH SENSITIVITY: 15 NG/L (ref 0–11)
TROPONIN, HIGH SENSITIVITY: 17 NG/L (ref 0–11)
WBC # BLD: 17.7 E9/L (ref 4.5–11.5)

## 2022-10-26 PROCEDURE — 2500000003 HC RX 250 WO HCPCS: Performed by: EMERGENCY MEDICINE

## 2022-10-26 PROCEDURE — 96375 TX/PRO/DX INJ NEW DRUG ADDON: CPT

## 2022-10-26 PROCEDURE — 86900 BLOOD TYPING SEROLOGIC ABO: CPT

## 2022-10-26 PROCEDURE — 82962 GLUCOSE BLOOD TEST: CPT

## 2022-10-26 PROCEDURE — 96374 THER/PROPH/DIAG INJ IV PUSH: CPT

## 2022-10-26 PROCEDURE — 99285 EMERGENCY DEPT VISIT HI MDM: CPT

## 2022-10-26 PROCEDURE — 36415 COLL VENOUS BLD VENIPUNCTURE: CPT

## 2022-10-26 PROCEDURE — 83605 ASSAY OF LACTIC ACID: CPT

## 2022-10-26 PROCEDURE — 84484 ASSAY OF TROPONIN QUANT: CPT

## 2022-10-26 PROCEDURE — 86901 BLOOD TYPING SEROLOGIC RH(D): CPT

## 2022-10-26 PROCEDURE — 6360000004 HC RX CONTRAST MEDICATION: Performed by: RADIOLOGY

## 2022-10-26 PROCEDURE — 85025 COMPLETE CBC W/AUTO DIFF WBC: CPT

## 2022-10-26 PROCEDURE — 80053 COMPREHEN METABOLIC PANEL: CPT

## 2022-10-26 PROCEDURE — 6360000002 HC RX W HCPCS: Performed by: EMERGENCY MEDICINE

## 2022-10-26 PROCEDURE — 86850 RBC ANTIBODY SCREEN: CPT

## 2022-10-26 PROCEDURE — 93005 ELECTROCARDIOGRAM TRACING: CPT | Performed by: PHYSICIAN ASSISTANT

## 2022-10-26 PROCEDURE — A4216 STERILE WATER/SALINE, 10 ML: HCPCS | Performed by: EMERGENCY MEDICINE

## 2022-10-26 PROCEDURE — 83880 ASSAY OF NATRIURETIC PEPTIDE: CPT

## 2022-10-26 PROCEDURE — 71275 CT ANGIOGRAPHY CHEST: CPT

## 2022-10-26 PROCEDURE — 2580000003 HC RX 258: Performed by: EMERGENCY MEDICINE

## 2022-10-26 RX ORDER — METHYLPREDNISOLONE SODIUM SUCCINATE 125 MG/2ML
125 INJECTION, POWDER, LYOPHILIZED, FOR SOLUTION INTRAMUSCULAR; INTRAVENOUS ONCE
Status: COMPLETED | OUTPATIENT
Start: 2022-10-26 | End: 2022-10-26

## 2022-10-26 RX ORDER — ONDANSETRON 2 MG/ML
4 INJECTION INTRAMUSCULAR; INTRAVENOUS ONCE
Status: COMPLETED | OUTPATIENT
Start: 2022-10-26 | End: 2022-10-26

## 2022-10-26 RX ORDER — 0.9 % SODIUM CHLORIDE 0.9 %
1000 INTRAVENOUS SOLUTION INTRAVENOUS ONCE
Status: COMPLETED | OUTPATIENT
Start: 2022-10-26 | End: 2022-10-26

## 2022-10-26 RX ORDER — DIPHENHYDRAMINE HYDROCHLORIDE 50 MG/ML
25 INJECTION INTRAMUSCULAR; INTRAVENOUS ONCE
Status: COMPLETED | OUTPATIENT
Start: 2022-10-26 | End: 2022-10-26

## 2022-10-26 RX ADMIN — SODIUM CHLORIDE 1000 ML: 9 INJECTION, SOLUTION INTRAVENOUS at 19:01

## 2022-10-26 RX ADMIN — ONDANSETRON 4 MG: 2 INJECTION INTRAMUSCULAR; INTRAVENOUS at 15:56

## 2022-10-26 RX ADMIN — DIPHENHYDRAMINE HYDROCHLORIDE 25 MG: 50 INJECTION, SOLUTION INTRAMUSCULAR; INTRAVENOUS at 16:29

## 2022-10-26 RX ADMIN — METHYLPREDNISOLONE SODIUM SUCCINATE 125 MG: 125 INJECTION, POWDER, FOR SOLUTION INTRAMUSCULAR; INTRAVENOUS at 15:56

## 2022-10-26 RX ADMIN — IOPAMIDOL 70 ML: 755 INJECTION, SOLUTION INTRAVENOUS at 17:08

## 2022-10-26 RX ADMIN — FAMOTIDINE 20 MG: 10 INJECTION, SOLUTION INTRAVENOUS at 15:56

## 2022-10-26 ASSESSMENT — ENCOUNTER SYMPTOMS
CHEST TIGHTNESS: 0
NAUSEA: 1
SHORTNESS OF BREATH: 0

## 2022-10-26 ASSESSMENT — PAIN - FUNCTIONAL ASSESSMENT: PAIN_FUNCTIONAL_ASSESSMENT: NONE - DENIES PAIN

## 2022-10-26 NOTE — ED NOTES
During IV insertion pt became diaphoretic and and had a + loc. Dr Blanca Francois called to bedside and labs obtained. bp 79/53. NS bolus initiated.        Patricia Zimmerman RN  10/26/22 [id number]

## 2022-10-26 NOTE — ED PROVIDER NOTES
59-year-old male presenting from home with concern about sudden onset of hives and rash. He took 25 mg of Benadryl about an hour and a half ago. Limited nausea present as well. He has no abdominal pain and has no other concerns except this rash that developed. This is a new problem, persistent, moderate severity, improving with Benadryl. Family History   Problem Relation Age of Onset    Cancer Mother         lung    Cancer Father         colon    Cancer Maternal Grandmother         lung     Past Surgical History:   Procedure Laterality Date    CARPAL TUNNEL RELEASE Bilateral     HERNIA REPAIR  08/2021    KNEE SURGERY      x2 last 2/2015    CATHI-EN-Y GASTRIC BYPASS N/A 10/18/2022    GASTRIC BYPASS CATHI-EN-Y LAPAROSCOPIC ROBOTIC XI performed by Rui Seaman MD at [address]  04/29/2022       Review of Systems   Constitutional:  Negative for chills and fever. Respiratory:  Negative for chest tightness and shortness of breath. Gastrointestinal:  Positive for nausea. Skin:  Positive for rash. All other systems reviewed and are negative. Physical Exam  Constitutional:       General: He is not in acute distress. Appearance: He is well-developed. HENT:      Head: Normocephalic and atraumatic. Eyes:      Pupils: Pupils are equal, round, and reactive to light. Neck:      Thyroid: No thyromegaly. Cardiovascular:      Rate and Rhythm: Normal rate and regular rhythm. Pulmonary:      Effort: Pulmonary effort is normal. No respiratory distress. Breath sounds: Normal breath sounds. No wheezing. Abdominal:      General: There is no distension. Palpations: Abdomen is soft. There is no mass. Tenderness: There is no abdominal tenderness. There is no guarding or rebound. Musculoskeletal:         General: No tenderness. Normal range of motion. Cervical back: Normal range of motion and neck supple.    Skin:     General: Skin is warm and dry. Findings: Rash present. No erythema. Neurological:      Mental Status: He is alert and oriented to person, place, and time. Cranial Nerves: No cranial nerve deficit. Psychiatric:         Mood and Affect: Mood normal.        Procedures     [facility]       ED Course as of 10/26/22 2033   Wed Oct 26, 2022   1521 Called to the bedside, patient became unresponsive. He had sonorous respirations, lost consciousness. Pale in appearance, diaphoretic, hypotensive at 70s over 40s. IV fluids initiated, patient started to wake up. Repeat blood pressures did improve. Despite improving blood pressure he remained lightheaded, somnolent, lethargic. He denied feeling any better at all. Glucose 116. [SO]   1521 EKG: Interpreted by me  Sinus rhythm calmative 83, normal axis, no ST elevations or depressions, no T wave abnormalities, no signs of right heart strain, no signs Brugada. [SO]   [id number] Patient fully awake and alert and oriented. Feeling very well, no distress or concerns or complaints right now. [SO]      ED Course User Index  [SO] Rachel Soto DO      ED Course as of 10/26/22 2033   Wed Oct 26, 2022   1521 Called to the bedside, patient became unresponsive. He had sonorous respirations, lost consciousness. Pale in appearance, diaphoretic, hypotensive at 70s over 40s. IV fluids initiated, patient started to wake up. Repeat blood pressures did improve. Despite improving blood pressure he remained lightheaded, somnolent, lethargic. He denied feeling any better at all. Glucose 116. [SO]   1521 EKG: Interpreted by me  Sinus rhythm calmative 83, normal axis, no ST elevations or depressions, no T wave abnormalities, no signs of right heart strain, no signs Brugada. [SO]   [id number] Patient fully awake and alert and oriented.   Feeling very well, no distress or concerns or complaints right now. [SO]      ED Course User Index  [SO] Rachel Soto DO --------------------------------------------- PAST HISTORY ---------------------------------------------  Past Medical History:  has a past medical history of Hyperlipidemia, Hyperthyroidism, Kidney stone, Morbid obesity due to excess calories ([address] Sarcoidosis, and Unspecified sleep apnea. Past Surgical History:  has a past surgical history that includes knee surgery; Carpal tunnel release (Bilateral); Upper gastrointestinal endoscopy (04/29/2022); Tonsillectomy; hernia repair (08/2021); and Lisa-en-Y Gastric Bypass (N/A, 10/18/2022). Social History:  reports that he has never smoked. He has never used smokeless tobacco. He reports current alcohol use. He reports that he does not use drugs. Family History: family history includes Cancer in his father, maternal grandmother, and mother. The patients home medications have been reviewed.     Allergies: Provigil [modafinil]    -------------------------------------------------- RESULTS -------------------------------------------------  Labs:  Results for orders placed or performed during the hospital encounter of 10/26/22   CBC with Auto Differential   Result Value Ref Range    WBC 17.7 (H) 4.5 - 11.5 E9/L    RBC 5.90 (H) 3.80 - 5.80 E12/L    Hemoglobin 16.6 (H) 12.5 - 16.5 g/dL    Hematocrit 49.0 37.0 - 54.0 %    MCV 83.1 80.0 - 99.9 fL    MCH 28.1 26.0 - 35.0 pg    MCHC 33.9 32.0 - 34.5 %    RDW 14.5 11.5 - 15.0 fL    Platelets 146 663 - 522 E9/L    MPV 9.6 7.0 - 12.0 fL    Neutrophils % 78.8 43.0 - 80.0 %    Immature Granulocytes % 0.6 0.0 - 5.0 %    Lymphocytes % 12.2 (L) 20.0 - 42.0 %    Monocytes % 7.6 2.0 - 12.0 %    Eosinophils % 0.5 0.0 - 6.0 %    Basophils % 0.3 0.0 - 2.0 %    Neutrophils Absolute 13.90 (H) 1.80 - 7.30 E9/L    Immature Granulocytes # 0.11 E9/L    Lymphocytes Absolute 2.15 1.50 - 4.00 E9/L    Monocytes Absolute 1.35 (H) 0.10 - 0.95 E9/L    Eosinophils Absolute 0.09 0.05 - 0.50 E9/L    Basophils Absolute 0.05 0.00 - 0.20 E9/L Comprehensive Metabolic Panel   Result Value Ref Range    Sodium 132 132 - 146 mmol/L    Potassium 4.3 3.5 - 5.0 mmol/L    Chloride 100 98 - 107 mmol/L    CO2 17 (L) 22 - 29 mmol/L    Anion Gap 15 7 - 16 mmol/L    Glucose 123 (H) 74 - 99 mg/dL    BUN 26 (H) 6 - 23 mg/dL    Creatinine 1.3 (H) 0.7 - 1.2 mg/dL    Est, Glom Filt Rate >60 >=60 mL/min/1.73    Calcium 9.5 8.6 - 10.2 mg/dL    Total Protein 7.1 6.4 - 8.3 g/dL    Albumin 3.8 3.5 - 5.2 g/dL    Total Bilirubin 1.1 0.0 - 1.2 mg/dL    Alkaline Phosphatase 79 40 - 129 U/L    ALT 25 0 - 40 U/L    AST 20 0 - 39 U/L   Lactic Acid   Result Value Ref Range    Lactic Acid 1.9 0.5 - 2.2 mmol/L   Troponin   Result Value Ref Range    Troponin, High Sensitivity 15 (H) 0 - 11 ng/L   Brain Natriuretic Peptide   Result Value Ref Range    Pro-BNP 28 0 - 125 pg/mL   Troponin   Result Value Ref Range    Troponin, High Sensitivity 17 (H) 0 - 11 ng/L   POCT Glucose   Result Value Ref Range    Meter Glucose 116 (H) 74 - 99 mg/dL   EKG 12 Lead   Result Value Ref Range    Ventricular Rate 83 BPM    Atrial Rate 83 BPM    P-R Interval 144 ms    QRS Duration 80 ms    Q-T Interval 374 ms    QTc Calculation (Bazett) 439 ms    P Axis 34 degrees    R Axis 52 degrees    T Axis 34 degrees   TYPE AND SCREEN   Result Value Ref Range    ABO/Rh AB POS     Antibody Screen NEG        Radiology:  CTA CHEST W CONTRAST   Final Result   No evidence of pulmonary embolism or acute pulmonary abnormality. Small type 1 hiatus hernia is seen. Subtle fluid visualized along the right lateral aspect of the liver.             ------------------------- NURSING NOTES AND VITALS REVIEWED ---------------------------  Date / Time Roomed:  10/26/2022  2:31 PM  ED Bed Assignment:  10/10    The nursing notes within the ED encounter and vital signs as below have been reviewed.    BP (!) 137/91   Pulse 75   Temp 97.5 °F (36.4 °C) (Oral)   Resp 12   Ht 6' (1.829 m)   Wt 290 lb (131.5 kg)   SpO2 92%   BMI 39.33 kg/m²   Oxygen Saturation Interpretation: Normal      ------------------------------------------ PROGRESS NOTES ------------------------------------------  I have spoken with the patient and discussed todays results, in addition to providing specific details for the plan of care and counseling regarding the diagnosis and prognosis. Their questions are answered at this time and they are agreeable with the plan. I discussed at length with them reasons for immediate return here for re evaluation. They will followup with primary care by calling their office tomorrow. --------------------------------- ADDITIONAL PROVIDER NOTES ---------------------------------  At this time the patient is without objective evidence of an acute process requiring hospitalization or inpatient management. They have remained hemodynamically stable throughout their entire ED visit and are stable for discharge with outpatient follow-up. The plan has been discussed in detail and they are aware of the specific conditions for emergent return, as well as the importance of follow-up. New Prescriptions    No medications on file       Diagnosis:  1. Allergic reaction, initial encounter    2. Vasovagal syncope    3. Hiatal hernia        Disposition:  Patient's disposition: Discharge to home  Patient's condition is stable.          David Haynes DO  10/26/22 2034

## 2022-10-26 NOTE — TELEPHONE ENCOUNTER
Received call from patient regarding having hives. Spoke with patient, he has not been doing or taking anything new and no history of this type of reaction. I explained that if it was related to his surgery, it would have occurred sooner. Patient denies any shortness of breath. I advised patient to take a Benadryl and follow up with PCP. I also instructed him that if he does become short of breath to go to the ER for evaluation. Patient verbalized understanding.

## 2022-10-27 LAB
EKG ATRIAL RATE: 83 BPM
EKG P AXIS: 34 DEGREES
EKG P-R INTERVAL: 144 MS
EKG Q-T INTERVAL: 374 MS
EKG QRS DURATION: 80 MS
EKG QTC CALCULATION (BAZETT): 439 MS
EKG R AXIS: 52 DEGREES
EKG T AXIS: 34 DEGREES
EKG VENTRICULAR RATE: 83 BPM

## 2022-10-27 PROCEDURE — 93010 ELECTROCARDIOGRAM REPORT: CPT | Performed by: INTERNAL MEDICINE

## 2022-10-28 ENCOUNTER — INITIAL CONSULT (OUTPATIENT)
Dept: BARIATRICS/WEIGHT MGMT | Age: 62
End: 2022-10-28

## 2022-10-28 ENCOUNTER — OFFICE VISIT (OUTPATIENT)
Dept: BARIATRICS/WEIGHT MGMT | Age: 62
End: 2022-10-28
Payer: COMMERCIAL

## 2022-10-28 VITALS — HEIGHT: 72 IN | BODY MASS INDEX: 39.42 KG/M2 | WEIGHT: 291 LBS

## 2022-10-28 VITALS
HEIGHT: 72 IN | WEIGHT: 291 LBS | DIASTOLIC BLOOD PRESSURE: 79 MMHG | HEART RATE: 74 BPM | TEMPERATURE: 97.7 F | RESPIRATION RATE: 20 BRPM | SYSTOLIC BLOOD PRESSURE: 154 MMHG | BODY MASS INDEX: 39.42 KG/M2

## 2022-10-28 DIAGNOSIS — K91.2 MALNUTRITION FOLLOWING GASTROINTESTINAL SURGERY: Primary | ICD-10-CM

## 2022-10-28 DIAGNOSIS — Z71.3 DIETARY COUNSELING: Primary | ICD-10-CM

## 2022-10-28 PROCEDURE — 99999 PR OFFICE/OUTPT VISIT,PROCEDURE ONLY: CPT

## 2022-10-28 PROCEDURE — 99024 POSTOP FOLLOW-UP VISIT: CPT | Performed by: SURGERY

## 2022-10-28 PROCEDURE — 99212 OFFICE O/P EST SF 10 MIN: CPT

## 2022-10-28 RX ORDER — EPINEPHRINE 0.3 MG/.3ML
0.3 INJECTION SUBCUTANEOUS ONCE
Qty: 0.3 ML | Refills: 2 | Status: SHIPPED | OUTPATIENT
Start: 2022-10-28 | End: 2022-10-28

## 2022-10-28 NOTE — PROGRESS NOTES
Kaci Cordon  10/28/2022  Laparoscopic Lisa-en- Y Gastric Bypass  Two weeks Post-Op Follow-up. Subjective:   Kaci Cordon is a 58 y.o. male is two weeks post Laparoscopic Lisa-en-Y Gastric Bypass. The patient is not having any pain. Had anaphylactic reaction to unknown source twice since surgery. Reports no problems with swallowing liquids, bowel movements, voiding, or the wounds. He is not having swallowing difficulty. He is meeting fluid recommendations of at least 64 ounces per day and is meeting protein recommendations. Exercise: no regular exercise. 291 lb (132 kg) Today's weight represents a weight loss of 17 pounds since surgery. Prior to Admission medications    Medication Sig Start Date End Date Taking?  Authorizing Provider   Fluticasone Furoate-Vilanterol (BREO ELLIPTA IN) Inhale into the lungs   Yes Historical Provider, MD   omeprazole (PRILOSEC) 20 MG delayed release capsule Take 1 capsule by mouth Daily 9/30/22 9/30/23 Yes Shyam Chapman MD   ondansetron (ZOFRAN-ODT) 4 MG disintegrating tablet Take 1 tablet by mouth every 8 hours as needed for Nausea or Vomiting 9/30/22  Yes Shyam Chapman MD   albuterol (PROVENTIL) (2.5 MG/3ML) 0.083% nebulizer solution Take 3 mLs by nebulization every 6 hours as needed for Wheezing 4/29/22  Yes Yolande Lozano MD   albuterol sulfate  (90 Base) MCG/ACT inhaler Inhale 2 puffs into the lungs every 6 hours as needed for Wheezing 11/19/21  Yes Yolande Lozano MD   Ascorbic Acid (VITAMIN C) 1000 MG tablet Take 1,000 mg by mouth daily    Yes Historical Provider, MD   valACYclovir (VALTREX) 1 g tablet Take 1 g by mouth as needed 6/28/21  Yes Historical Provider, MD   Cholecalciferol (VITAMIN D) 2000 units CAPS capsule Take 1 capsule by mouth daily    Yes Historical Provider, MD   aspirin 81 MG tablet Take 81 mg by mouth daily   Yes Historical Provider, MD   levothyroxine (SYNTHROID) 150 MCG tablet Take 150 mcg by mouth Daily   Yes Historical Provider, MD        Physical exam:  VITALS: BP (!) 154/79 (Site: Left Lower Arm, Position: Sitting, Cuff Size: Medium Adult)   Pulse 74   Temp 97.7 °F (36.5 °C) (Temporal)   Resp 20   Ht 6' (1.829 m)   Wt 291 lb (132 kg)   BMI 39.47 kg/m²    General appearance: alert, appears stated age and cooperative  Head: Normocephalic, without obvious abnormality, atraumatic  Neck: no adenopathy, no carotid bruit, no JVD, supple, symmetrical, trachea midline and thyroid not enlarged, symmetric, no tenderness/mass/nodules  Lungs: clear to auscultation bilaterally  Heart: regular rate and rhythm  Abdomen:  Incisions healing well, surgical glue in place, no allergic reaction, no cellulitis  Extremities: extremities normal, atraumatic, no cyanosis or edema    Assessment:    He is two weeks post laparoscopic gastric bypass. Plan:   Doing well. Walk as much as possible but keep your legs elevated when sitting. Continue deep breathing exercizes. Transition to the high protein Pureed-liquid diet. Continue drinking 1 oz every 10-15 minutes to prevent dehydration. Slowly increase this amount as tolerated. Aim for 60 gm Protein and 90 oz of liquids per day. Slow down if you feel chest pressure, acid or fullness. Track protein and fluid intake and bring to your next appointment. Follow up in 4 weeks and call the clinic if questions arise in the meantime. Repeat labs in one month. Make sure the bowel move daily by taking fiber such as Metamucil. Will Rx epi pen in case furterh reactions.       Physician Signature: Electronically signed by Dr. Letty Haskins MD

## 2022-10-28 NOTE — PATIENT INSTRUCTIONS

## 2022-10-28 NOTE — PROGRESS NOTES
Patient is 2 wk LRYGB. Incisions are healing well. Water intake is around 64 oz daily. Protein through shakes and foods. Bowel movements are good. Scheduled with Suzette Xiong for dietary. Patient was in the ER with hives and low BP.

## 2022-10-28 NOTE — PROGRESS NOTES
Medical Nutrition Therapy (MNT) Assessment     Pt. Name: Max Mail   Date:10/28/2022  F/U Appt: Sx Type 2 week rygb      Ht 6' (1.829 m)   Wt 291 lb (132 kg)   BMI 39.47 kg/m²  Height: 6' (1.829 m) Weight: 291 lb (132 kg)   IBW:ideal body weight   188 lbs  % EBWL: 14%     Wt Readings from Last 3 Encounters:   10/28/22 291 lb (132 kg)   10/28/22 291 lb (132 kg)   10/26/22 290 lb (131.5 kg)                     Protein supplements:   Protein currently using  [employer] protein shake with 1 scoop Iso pure , 1 fairlife alone    Daily grams of protein 85  Daily Protein needs (based on 1.0 gram per kg of IBW)  85-95 grams       Subjective:                    MNT ADVANCE TO:     Bariatric puree diet with MVI, Calcium & Protein Supplements      Allergies and Food Allergies and Food Intolerances:  none    [facility] [place] Bowel Protocol:  no - Diarrhea  Yes - Constipation    only 2 Bms since surgery   How much plain water are you drinking daily 51-67 oz,  plus flavored water - 1 bottle daily on avg  Yes - Are you taking Colace x2 daily    No - Are you taking Sugar Free Chewable Fiber Gummies            How many meals a day 6 / Portions Sizes of Meals are 3 oz          Labs: 10/26/22:  glucose 123 H, Bun 26H, creat 1.3H    Supplements: none yet     Estimated Daily Nutritional Needs: Based on Bariatric procedure for Wt. Loss  Energy: Will be calculated at Maintenance Stage    Protein: 60 - 80 gms Daily    MNT Plan and Additional Education: RD/LD reviewed Bariatric Puree Diet and how to puree food. Encouraged pt to meet protein and fluid needs daily. Pt. verbalized understanding. Handouts given. Pt. Instructed to call with questions.      MEDICAL NUTRITION THERAPY (MNT) - Nutrition Care Plan   (The patient has been educated and given written education material that reinforces the following dietary guidelines for Bariatric Surgery)  Goal:  Patient able to verbalize the following dietary concepts:  3 to 4 ounce portions per meal     6 small meals daily      60 to 80 grams of protein   48 to 64 ounces of fluid daily (water)     Always consume protein item first with all meals   Pt is aware wt loss is pt controlled. Slow Meals - 30-35 chews per bite / Meals 30 - 45 minutes long            The RD / LD reviewed with the patient that the dietary goals of the bariatric patient is to ensure that patient is able to meet established nutritional needs for a Bariatric Surgery  Patient at this time in order to promote healing, prevent significant weight changes, prevent skin breakdown and abnormal lab values, prevent complications, and tolerance to diet and texture of foods. Pt is able to identify proper food choices and needed changes and is able to explain proper food preparation. RD / LD reviewed compliance with assigned diet stages, volume of food and drink consumed, timing of meals, amount of protein and energy intake, daily vitamin and mineral supplementation, identify food cravings and any adverse reactions associated with intake of food and drink. RD / LD uses behavioral tools such as goal setting, MI, and self-monitoring, to reinforce the anatomic and physiologic effects of the surgery, so that the described behaviors become more of a habit not simply a reaction to the altered anatomy. Care Plan:   Rd/Ld Addressed Food Records or 24-Hour Recall  Rd / Ld encouraged patient to exercise at least 30 minutes daily  Rd / Ld instructed patient on how to increase oral protein intake within the diet. Pt. can verbalize he / she will need to consume 60 - 80 grams. Rd / Ld instructed the patient on how to increase the use of protein supplements within the diet. Pt. was instructed on how to increase water intake. Patient will need to consume 48 - 64 oz. of just plain water in addition to 30 oz. of non-caloric beverages.   Handouts given to patient  RD / LD encouraged oral intake    RD / LD encouraged pt. to keep food records.       Pt. is able to verbalize diet concepts         Yes - Pt. diet was advanced to the following stage ( See above)     Good - Dietary Compliance

## 2022-11-17 ENCOUNTER — TELEPHONE (OUTPATIENT)
Dept: BARIATRICS/WEIGHT MGMT | Age: 62
End: 2022-11-17

## 2022-11-17 NOTE — TELEPHONE ENCOUNTER
Patient called with insurance questions, the OM and I answered his questions and suggested that he call his insurance company to find out the same information.

## 2022-12-02 ENCOUNTER — INITIAL CONSULT (OUTPATIENT)
Dept: BARIATRICS/WEIGHT MGMT | Age: 62
End: 2022-12-02

## 2022-12-02 ENCOUNTER — OFFICE VISIT (OUTPATIENT)
Dept: BARIATRICS/WEIGHT MGMT | Age: 62
End: 2022-12-02

## 2022-12-02 VITALS — WEIGHT: 272 LBS | HEIGHT: 72 IN | BODY MASS INDEX: 36.84 KG/M2

## 2022-12-02 VITALS
HEART RATE: 64 BPM | WEIGHT: 272 LBS | DIASTOLIC BLOOD PRESSURE: 72 MMHG | TEMPERATURE: 97.6 F | SYSTOLIC BLOOD PRESSURE: 115 MMHG | RESPIRATION RATE: 20 BRPM | HEIGHT: 72 IN | BODY MASS INDEX: 36.84 KG/M2

## 2022-12-02 DIAGNOSIS — K91.2 MALNUTRITION FOLLOWING GASTROINTESTINAL SURGERY: ICD-10-CM

## 2022-12-02 DIAGNOSIS — R10.9 FLANK PAIN: ICD-10-CM

## 2022-12-02 DIAGNOSIS — Z71.3 DIETARY COUNSELING: Primary | ICD-10-CM

## 2022-12-02 DIAGNOSIS — K91.2 MALNUTRITION FOLLOWING GASTROINTESTINAL SURGERY: Primary | ICD-10-CM

## 2022-12-02 PROCEDURE — 99999 PR OFFICE/OUTPT VISIT,PROCEDURE ONLY: CPT

## 2022-12-02 NOTE — PROGRESS NOTES
Khushi Mora  12/2/2022  [address]     Lisa-en- Y Gastric Bypass  6 week Post-Operative Follow-up     Khushi Mora is a 58 y.o. male who is 6 week  post Laparoscopic Lisa-en-Y Gastric Bypass surgery. Reports that he is doing good, concerned about a few incisions still being tender. Margi Lujan He is not having swallowing difficulty. Patient denies nausea and vomiting. Patient denies gastric reflux symptoms. Bowel movements are normal per patient. Patient is compliant all of the time with the multivitamins and calcium + Vit D. He is meeting fluid recommendations of at least 64 ounces per day and is meeting protein recommendations, reports that he is doing 2 pre made fairlife shakes per day plus a scoop of isopure. He  is not exercising: no regular exercise. Patient is not taking fiber. Weight=272 lb (123.4 kg)  Today's weight represents a total weight loss of 36 pounds since surgery with 0 pounds regained since the lowest weight. Prior to Admission medications    Medication Sig Start Date End Date Taking?  Authorizing Provider   EPINEPHrine (EPIPEN 2-SAYDA) 0.3 MG/0.3ML SOAJ injection Inject 0.3 mLs into the muscle once for 1 dose Use as directed for allergic reaction 10/28/22 12/2/22 Yes Stephany Jones MD   omeprazole (PRILOSEC) 20 MG delayed release capsule Take 1 capsule by mouth Daily 9/30/22 9/30/23 Yes Stephany Jones MD   ondansetron (ZOFRAN-ODT) 4 MG disintegrating tablet Take 1 tablet by mouth every 8 hours as needed for Nausea or Vomiting 9/30/22  Yes Stephany Jones MD   valACYclovir (VALTREX) 1 g tablet Take 1 g by mouth as needed 6/28/21  Yes Historical Provider, MD   aspirin 81 MG tablet Take 81 mg by mouth daily   Yes Historical Provider, MD   levothyroxine (SYNTHROID) 150 MCG tablet Take 150 mcg by mouth Daily   Yes Historical Provider, MD        Allergies   Allergen Reactions    Provigil [Modafinil] Dermatitis     Severe dermatitis           Past Medical History: Diagnosis Date    Hyperlipidemia     Hyperthyroidism     Kidney stone     Morbid obesity due to excess calories ([facility] [address]     Sarcoidosis     Unspecified sleep apnea        Past Surgical History:   Procedure Laterality Date    CARPAL TUNNEL RELEASE Bilateral     HERNIA REPAIR  08/2021    KNEE SURGERY      x2 last 2/2015    CATHI-EN-Y GASTRIC BYPASS N/A 10/18/2022    GASTRIC BYPASS CATHI-EN-Y LAPAROSCOPIC ROBOTIC XI performed by Lavera Baumgarten, MD at [address] [facility] [address]  04/29/2022       Current Outpatient Medications   Medication Sig Dispense Refill    EPINEPHrine (EPIPEN 2-SAYDA) 0.3 MG/0.3ML SOAJ injection Inject 0.3 mLs into the muscle once for 1 dose Use as directed for allergic reaction 0.3 mL 2    omeprazole (PRILOSEC) 20 MG delayed release capsule Take 1 capsule by mouth Daily 30 capsule 12    ondansetron (ZOFRAN-ODT) 4 MG disintegrating tablet Take 1 tablet by mouth every 8 hours as needed for Nausea or Vomiting 15 tablet 0    valACYclovir (VALTREX) 1 g tablet Take 1 g by mouth as needed      aspirin 81 MG tablet Take 81 mg by mouth daily      levothyroxine (SYNTHROID) 150 MCG tablet Take 150 mcg by mouth Daily       No current facility-administered medications for this visit. Allergies   Allergen Reactions    Provigil [Modafinil] Dermatitis     Severe dermatitis       Family History   Problem Relation Age of Onset    Cancer Mother         lung    Cancer Father         colon    Cancer Maternal Grandmother         lung       Social History     Socioeconomic History    Marital status:      Spouse name: Not on file    Number of children: Not on file    Years of education: Not on file    Highest education level: Not on file   Occupational History    Occupation:  self employed   Tobacco Use    Smoking status: Never    Smokeless tobacco: Never   Vaping Use    Vaping Use: Never used   Substance and Sexual Activity    Alcohol use:  Yes     Alcohol/week: 0.0 standard drinks     Comment: socially    Drug use: No    Sexual activity: Not Currently     Partners: Female   Other Topics Concern    Not on file   Social History Narrative    Not on file     Social Determinants of Health     Financial Resource Strain: Not on file   Food Insecurity: Not on file   Transportation Needs: Not on file   Physical Activity: Not on file   Stress: Not on file   Social Connections: Not on file   Intimate Partner Violence: Not on file   Housing Stability: Not on file       A complete 10 system review was performed and are otherwise negative unless mentioned in the above HPI. Specific negatives are listed below but may not include all those reviewed.     General ROS: negative obtundation, AMS  ENT ROS: negative rhinorrhea, epistaxis  Allergy and Immunology ROS: negative itchy/watery eyes or nasal congestion  Hematological and Lymphatic ROS: negative spontaneous bleeding or bruising  Endocrine ROS: negative  lethargy, mood swings, palpitations or polydipsia/polyuria  Respiratory ROS: negative sputum changes, stridor, tachypnea or wheezing  Cardiovascular ROS: negative for - loss of consciousness, murmur or orthopnea  Gastrointestinal ROS: negative for - hematochezia or hematemesis  Genito-Urinary ROS: negative for -  genital discharge or hematuria  Musculoskeletal ROS: negative for - focal weakness, gangrene  Psych/Neuro ROS: negative for - visual or auditory hallucinations, suicidal ideation        Physical exam:   /72 (Site: Right Upper Arm, Position: Sitting, Cuff Size: Large Adult)   Pulse 64   Temp 97.6 °F (36.4 °C) (Temporal)   Resp 20   Ht 6' (1.829 m)   Wt 272 lb (123.4 kg)   BMI 36.89 kg/m²    General appearance: alert, appears stated age and cooperative  Head: Normocephalic, without obvious abnormality, atraumatic  Neck: no adenopathy, no carotid bruit, no JVD, supple, symmetrical, trachea midline and thyroid not enlarged, symmetric, no tenderness/mass/nodules  Lungs: clear to auscultation bilaterally  Heart: regular rate and rhythm  Abdomen: soft, non-tender; bowel sounds normal; no masses,  no organomegaly  Extremities: extremities normal, atraumatic, no cyanosis or edema    Assessment: Post Lisa-en- Y Gastric Bypass. He does not complain of GERD,  does have sleep apnea,  does not have diabetes,  does not have hypertension off medical treatment. Cholesterol and triglycerides are normal.    1. Malnutrition following gastrointestinal surgery  See below  - CBC; Future  - Comprehensive Metabolic Panel; Future  - Ferritin; Future  - Triglyceride; Future  - Cholesterol, Total; Future  - Zinc; Future  - Vitamin B12; Future  - Folate; Future  - Vitamin B1; Future  - Prealbumin; Future      Plan:  Continue to eat a high protein, low calorie diet, eat small portions very slowly and chew well before swallowing. Drink plenty of water and fluids. Make sure to use fiber to keep the bowels regular. Try to exercise 7 days per week, maintain adequate variety and balance. Always notify the clinic if you have any medical problems.  Follow up in 6 weeks      Physician Signature: Electronically signed by BOLIVAR Navarro CNP

## 2022-12-02 NOTE — PROGRESS NOTES
Medical Nutrition Therapy (MNT) Assessment     Pt. Name: Sonu Lucas   Date:12/2/2022  F/U Appt: Sx Type 6 week rygb       Ht 6' (1.829 m)   Wt 272 lb (123.4 kg)   BMI 36.89 kg/m²  Height: 6' (1.829 m) Weight: 272 lb (123.4 kg)   IBW:ideal body weight   188 lbs % EBWL: 30%     Wt Readings from Last 3 Encounters:   12/02/22 272 lb (123.4 kg)   10/28/22 291 lb (132 kg)   10/28/22 291 lb (132 kg)                     Protein supplements:   Daily Protein needs (based on 1.0 gram per kg of IBW)  85-95 grams   Pt using Isopure with NewsBreak shakes and takes in 90 g daily       Subjective:                    MNT ADVANCE TO:     Bariatric soft diet with MVI, Calcium & Protein Supplements          SWLC Bowel Protocol:  no - Diarrhea  yes - Constipation  using Miralax prn  How much plain water are you drinking daily 67 oz or more            How many meals a day 6 / Portions Sizes of Meals are 4 oz          Labs: none    Supplements: Bariatric Advantage Multi-Vitamin 1 tablet 2 times Daily, Bariatric Advantage 29 mgs Iron 1 tablet Daily, Bariatric Advanatge Calcium Lozenges 1 tablet 3 times Daily , Dry Vitamin D3 5,000iu every day      Estimated Daily Nutritional Needs: Based on Bariatric procedure for Wt. Loss  Energy: Will be calculated at Maintenance Stage    Protein: 60 - 80 gms Daily    MNT Plan and Additional Education: RD/LD reviewed Bariatric Soft Diet. Encouraged pt to meet protein and fluid needs daily. Pt. verbalized understanding. Handouts given. Pt. instructed to call with questions.      MEDICAL NUTRITION THERAPY (MNT) - Nutrition Care Plan   (The patient has been educated and given written education material that reinforces the following dietary guidelines for Bariatric Surgery)  Goal:  Patient able to verbalize the following dietary concepts:  3 to 4 ounce portions per meal     6 small meals daily      60 to 80 grams of protein   48 to 64 ounces of fluid daily (water)     Always consume protein item first with all meals   Pt is aware wt loss is pt controlled. Slow Meals - 30-35 chews per bite / Meals 30 - 45 minutes long            The RD / LD reviewed with the patient that the dietary goals of the bariatric patient is to ensure that patient is able to meet established nutritional needs for a Bariatric Surgery  Patient at this time in order to promote healing, prevent significant weight changes, prevent skin breakdown and abnormal lab values, prevent complications, and tolerance to diet and texture of foods. Pt is able to identify proper food choices and needed changes and is able to explain proper food preparation. RD / LD reviewed compliance with assigned diet stages, volume of food and drink consumed, timing of meals, amount of protein and energy intake, daily vitamin and mineral supplementation, identify food cravings and any adverse reactions associated with intake of food and drink. RD / LD uses behavioral tools such as goal setting, MI, and self-monitoring, to reinforce the anatomic and physiologic effects of the surgery, so that the described behaviors become more of a habit not simply a reaction to the altered anatomy. Care Plan:   Rd/Ld Addressed Food Records or 24-Hour Recall  Rd / Ld encouraged patient to exercise at least 30 minutes daily  Rd / Ld instructed patient on how to increase oral protein intake within the diet. Pt. can verbalize he / she will need to consume 60 - 80 grams. Rd / Ld instructed the patient on how to increase the use of protein supplements within the diet. Pt. was instructed on how to increase water intake. Patient will need to consume 48 - 64 oz. of just plain water in addition to 30 oz. of non-caloric beverages. Handouts given to patient  RD / LD encouraged oral intake    RD / LD encouraged pt. to keep food records.       Pt. is able to verbalize diet concepts         Yes - Pt. diet was advanced to the following stage ( See above)     Good - Dietary Compliance

## 2022-12-02 NOTE — PROGRESS NOTES
6 week LRYGB follow up. Water intake is 68 or more oz daily, having difficulty with bowel movements and is taking miralax every other to every third day. Getting protein through shakes and incorporating different foods.

## 2022-12-02 NOTE — PATIENT INSTRUCTIONS
Please continue to take your vitamin and mineral supplements as instructed. If you received a blood work prescription today for laboratory monitoring due prior to your next routine follow-up visit, please have this blood work obtained 10 to 14 days prior to your next visit. It is important to fast for 12 hours prior to routine weight loss surgery blood work, EXCEPT for drinking water, to ensure accuracy of results. Please report nausea, vomiting, abdominal pain, or any other problems you experience to your surgeon. For problems related to weight loss surgery, it is best to go to [address] [place] [address] Emergency Department and have your surgeon paged.

## 2022-12-06 DIAGNOSIS — Z02.83 ENCOUNTER FOR DRUG SCREENING: Primary | ICD-10-CM

## 2022-12-06 DIAGNOSIS — R10.9 FLANK PAIN: ICD-10-CM

## 2022-12-06 LAB
BILIRUBIN URINE: NEGATIVE
BLOOD, URINE: NEGATIVE
CLARITY: CLEAR
COLOR: YELLOW
GLUCOSE URINE: NEGATIVE MG/DL
KETONES, URINE: NEGATIVE MG/DL
LEUKOCYTE ESTERASE, URINE: NEGATIVE
NITRITE, URINE: NEGATIVE
PH UA: 6 (ref 5–9)
PROTEIN UA: NEGATIVE MG/DL
SPECIFIC GRAVITY UA: 1.02 (ref 1–1.03)
UROBILINOGEN, URINE: 0.2 E.U./DL

## 2022-12-06 PROCEDURE — 81003 URINALYSIS AUTO W/O SCOPE: CPT | Performed by: NURSE PRACTITIONER

## 2022-12-19 ENCOUNTER — OFFICE VISIT (OUTPATIENT)
Dept: CARDIOLOGY CLINIC | Age: 62
End: 2022-12-19
Payer: COMMERCIAL

## 2022-12-19 VITALS
HEART RATE: 64 BPM | SYSTOLIC BLOOD PRESSURE: 108 MMHG | WEIGHT: 271 LBS | RESPIRATION RATE: 12 BRPM | HEIGHT: 72 IN | DIASTOLIC BLOOD PRESSURE: 72 MMHG | BODY MASS INDEX: 36.7 KG/M2

## 2022-12-19 DIAGNOSIS — E78.00 HYPERCHOLESTEROLEMIA: ICD-10-CM

## 2022-12-19 DIAGNOSIS — I25.10 CAD IN NATIVE ARTERY: Primary | ICD-10-CM

## 2022-12-19 PROCEDURE — 99213 OFFICE O/P EST LOW 20 MIN: CPT | Performed by: INTERNAL MEDICINE

## 2022-12-19 PROCEDURE — 93000 ELECTROCARDIOGRAM COMPLETE: CPT | Performed by: INTERNAL MEDICINE

## 2022-12-19 NOTE — PROGRESS NOTES
Patient Active Problem List   Diagnosis    SANJAY treated with BiPAP    Sarcoidosis of lung (HCC)    SOB (shortness of breath)    CAD in native artery    S/P gastric bypass    Morbid obesity (HCC)       Current Outpatient Medications   Medication Sig Dispense Refill    EPINEPHrine (EPIPEN 2-SAYDA) 0.3 MG/0.3ML SOAJ injection Inject 0.3 mLs into the muscle once for 1 dose Use as directed for allergic reaction 0.3 mL 2    omeprazole (PRILOSEC) 20 MG delayed release capsule Take 1 capsule by mouth Daily 30 capsule 12    ondansetron (ZOFRAN-ODT) 4 MG disintegrating tablet Take 1 tablet by mouth every 8 hours as needed for Nausea or Vomiting 15 tablet 0    valACYclovir (VALTREX) 1 g tablet Take 1 g by mouth as needed      aspirin 81 MG tablet Take 81 mg by mouth daily      levothyroxine (SYNTHROID) 150 MCG tablet Take 150 mcg by mouth Daily       No current facility-administered medications for this visit. CC:    Patient is seen in follow up for:  1. CAD in native artery - mild    2. Hypercholesterolemia        HPI:  Patient is seen in follow-up after recent heart catheterization. He was noted to have mild disease of the LAD but no significant stenosis. Relates he is scheduled to follow up with Dr Renee Baum for his cholesterol post bariatric surgery.     ROS:   General: No unusual weight gain, no change in exercise tolerance  Skin: No rash or itching  EENT: No vision changes or nosebleeds  Cardiovascular: No orthopnea or paroxysmal nocturnal dyspnea  Respiratory: No cough or hemoptysis  Gastrointestinal: No hematemesis or recent changes in bowel habits  Genitourinary: No hematuria, urgency or frequency  Musculoskeletal: No muscular weakness or joint swelling   Neurologic / Psychiatric: No incoordination or convulsions  Allergic / Immunologic/ Lymphatic / Endocrine: No anemia or bleeding tendency    Social History     Socioeconomic History    Marital status:      Spouse name: Not on file    Number of children: Not on file    Years of education: Not on file    Highest education level: Not on file   Occupational History    Occupation:  self employed   Tobacco Use    Smoking status: Never    Smokeless tobacco: Never   Vaping Use    Vaping Use: Never used   Substance and Sexual Activity    Alcohol use: Yes     Alcohol/week: 0.0 standard drinks     Comment: socially    Drug use: No    Sexual activity: Not Currently     Partners: Female   Other Topics Concern    Not on file   Social History Narrative    Not on file     Social Determinants of Health     Financial Resource Strain: Not on file   Food Insecurity: Not on file   Transportation Needs: Not on file   Physical Activity: Not on file   Stress: Not on file   Social Connections: Not on file   Intimate Partner Violence: Not on file   Housing Stability: Not on file       Family History   Problem Relation Age of Onset    Cancer Mother         lung    Cancer Father         colon    Cancer Maternal Grandmother         lung       Past Medical History:   Diagnosis Date    Hyperlipidemia     Hyperthyroidism     Kidney stone     Morbid obesity due to excess calories ([address]     Sarcoidosis     Unspecified sleep apnea        PHYSICAL EXAM:  CONSTITUTIONAL:  Well developed, well nourished    Vitals:    12/19/22 0758   BP: 108/72   Pulse: 64   Resp: 12   Weight: 271 lb (122.9 kg)   Height: 6' (1.829 m)     HEAD & FACE: Normocephalic. Symmetric. EYES: No xanthelasma. Conjunctivae not injected. EARS, NOSE, MOUTH & THROAT: Good dentition. No oral pallor or cyanosis. NECK: No JVD at 30 degrees. No thyromegaly. RESPIRATORY: Clear to auscultation and percussion in all fields. No use of accessory muscle or intercostal retractions. CARDIOVASCULAR: Regular rate and rhythm. No lifts or thrills on palpitation. Auscultation with normal S1-S2 in intensity and splitting. No carotid bruits. Abdominal aorta not enlarged. Femoral arteries without bruits. Pedal pulses 2+.   No edema.    ABDOMEN: Soft without hepatic or splenic enlargement. No tenderness. MUSCULOSKELETAL: No kyphosis or scoliosis of the back. Good muscle strength and tone. No muscle atrophy. Normal gait and ability to undergo exercise stress testing. EXTREMITIES: No clubbing or cyanosis. SKIN: No Xanthomas or ulcerations. NEUROLOGIC: Oriented to time, place and person. Normal mood and affect. LYMPHATIC:  No palpable neck or supraclavicular nodes. No splenomegaly. EKG: the EKG tracing was reviewed and found to reveal: Normal sinus rhythm. Nonspecific STT changes,.  ms. No change compared to prior tracing. ASSESSMENT:                                                     ORDERS:       Diagnosis Orders   1. CAD in native artery - mild  EKG 12 lead      2. Hypercholesterolemia          Above assessment cardiac issues stable. PLAN:   See above orders. Medication reconciliation completed. Old records were reviewed and found to reveal: LDL 93 on 9/20/2021  Discussed issues that would prompt earlier evaluation.      Follow-up office visit prn Doxycycline Counseling:  Patient counseled regarding possible photosensitivity and increased risk for sunburn.  Patient instructed to avoid sunlight, if possible.  When exposed to sunlight, patients should wear protective clothing, sunglasses, and sunscreen.  The patient was instructed to call the office immediately if the following severe adverse effects occur:  hearing changes, easy bruising/bleeding, severe headache, or vision changes.  The patient verbalized understanding of the proper use and possible adverse effects of doxycycline.  All of the patient's questions and concerns were addressed.

## 2023-01-03 DIAGNOSIS — K91.2 MALNUTRITION FOLLOWING GASTROINTESTINAL SURGERY: ICD-10-CM

## 2023-01-03 LAB
ALBUMIN SERPL-MCNC: 4.2 G/DL (ref 3.5–5.2)
ALP BLD-CCNC: 102 U/L (ref 40–129)
ALT SERPL-CCNC: 18 U/L (ref 0–40)
ANION GAP SERPL CALCULATED.3IONS-SCNC: 15 MMOL/L (ref 7–16)
AST SERPL-CCNC: 21 U/L (ref 0–39)
BILIRUB SERPL-MCNC: 0.6 MG/DL (ref 0–1.2)
BUN BLDV-MCNC: 12 MG/DL (ref 6–23)
CALCIUM SERPL-MCNC: 10.1 MG/DL (ref 8.6–10.2)
CHLORIDE BLD-SCNC: 101 MMOL/L (ref 98–107)
CHOLESTEROL, TOTAL: 186 MG/DL (ref 0–199)
CO2: 24 MMOL/L (ref 22–29)
CREAT SERPL-MCNC: 1.1 MG/DL (ref 0.7–1.2)
FERRITIN: 64 NG/ML
FOLATE: 11.3 NG/ML (ref 4.8–24.2)
GFR SERPL CREATININE-BSD FRML MDRD: >60 ML/MIN/1.73
GLUCOSE BLD-MCNC: 97 MG/DL (ref 74–99)
HCT VFR BLD CALC: 41.9 % (ref 37–54)
HEMOGLOBIN: 13.6 G/DL (ref 12.5–16.5)
MCH RBC QN AUTO: 27.4 PG (ref 26–35)
MCHC RBC AUTO-ENTMCNC: 32.5 % (ref 32–34.5)
MCV RBC AUTO: 84.5 FL (ref 80–99.9)
PDW BLD-RTO: 14 FL (ref 11.5–15)
PLATELET # BLD: 285 E9/L (ref 130–450)
PMV BLD AUTO: 10.3 FL (ref 7–12)
POTASSIUM SERPL-SCNC: 4.2 MMOL/L (ref 3.5–5)
PREALBUMIN: 21 MG/DL (ref 20–40)
RBC # BLD: 4.96 E12/L (ref 3.8–5.8)
SODIUM BLD-SCNC: 140 MMOL/L (ref 132–146)
TOTAL PROTEIN: 7 G/DL (ref 6.4–8.3)
TRIGL SERPL-MCNC: 112 MG/DL (ref 0–149)
VITAMIN B-12: 1490 PG/ML (ref 211–946)
WBC # BLD: 6.5 E9/L (ref 4.5–11.5)

## 2023-01-06 LAB — ZINC: 78.7 UG/DL (ref 60–120)

## 2023-01-08 LAB — VITAMIN B1 WHOLE BLOOD: 158 NMOL/L (ref 70–180)

## 2023-01-13 ENCOUNTER — OFFICE VISIT (OUTPATIENT)
Dept: BARIATRICS/WEIGHT MGMT | Age: 63
End: 2023-01-13

## 2023-01-13 ENCOUNTER — INITIAL CONSULT (OUTPATIENT)
Dept: BARIATRICS/WEIGHT MGMT | Age: 63
End: 2023-01-13

## 2023-01-13 VITALS — WEIGHT: 266 LBS | HEIGHT: 72 IN | BODY MASS INDEX: 36.03 KG/M2

## 2023-01-13 VITALS
DIASTOLIC BLOOD PRESSURE: 77 MMHG | RESPIRATION RATE: 20 BRPM | BODY MASS INDEX: 36.03 KG/M2 | WEIGHT: 266 LBS | HEIGHT: 72 IN | TEMPERATURE: 97.7 F | SYSTOLIC BLOOD PRESSURE: 129 MMHG | HEART RATE: 77 BPM

## 2023-01-13 DIAGNOSIS — E66.01 MORBID OBESITY DUE TO EXCESS CALORIES (HCC): Primary | ICD-10-CM

## 2023-01-13 DIAGNOSIS — Z71.3 DIETARY COUNSELING: Primary | ICD-10-CM

## 2023-01-13 PROCEDURE — 99024 POSTOP FOLLOW-UP VISIT: CPT | Performed by: SURGERY

## 2023-01-13 PROCEDURE — 99999 PR OFFICE/OUTPT VISIT,PROCEDURE ONLY: CPT

## 2023-01-13 RX ORDER — LACTULOSE 10 G/15ML
10 SOLUTION ORAL EVERY EVENING
Qty: 237 ML | Refills: 1 | Status: SHIPPED | OUTPATIENT
Start: 2023-01-13

## 2023-01-13 NOTE — PROGRESS NOTES
Patient is 3 mo LRYGB. Water intake is around 128 oz daily. Protein through shakes and foods. Vitamin intake is good. Bowel movements - constipation. Dietary.

## 2023-01-13 NOTE — PATIENT INSTRUCTIONS
Please continue to take your vitamin and mineral supplements as instructed. If you received a blood work prescription today for laboratory monitoring due prior to your next routine follow-up visit, please have this blood work obtained 10 to 14 days prior to your next visit. It is important to fast for 12 hours prior to routine weight loss surgery blood work, EXCEPT for drinking water, to ensure accuracy of results. Please report nausea, vomiting, abdominal pain, or any other problems you experience to your surgeon. For problems related to weight loss surgery, it is best to go to [address] [place] [address] Emergency Department and have your surgeon paged.

## 2023-01-13 NOTE — PROGRESS NOTES
Joyce Prado  1/13/2023  Laparoscopic Lisa-en- Y Gastric Bypass   3 months Post-Operative Follow-up. Subjective:   Joyce Prado is a 58 y.o. male three months post Laparoscopic Lisa-en-Y Gastric Bypass. He reports constipation. Reports no problems with eating, voiding, or the wounds. He is not having swallowing difficulty, is compliant most of the time with the multivitamins and calcium + Vit D. He is meeting fluid recommendations of at least 64 ounces per day and is meeting protein recommendations. Exercise: no regular exercise. Constipation with no relief once daily miralax and intermittent compliance. No hemorrhoids, no bleeding. He is taking in 64oz fluids but also eating candy on occasion. Had Jenera and sauerkraut on New Years, had heartburn then. I can tell he is not complaint with diet and he has only lost 6 lbs in last 6 weeks. No exercise efforts. Weight=266 lb (120.7 kg)  Today's weight represents a total weight loss to date of 42 pounds. Prior to Admission medications    Medication Sig Start Date End Date Taking?  Authorizing Provider   omeprazole (PRILOSEC) 20 MG delayed release capsule Take 1 capsule by mouth Daily 9/30/22 9/30/23 Yes Amos Mtz MD   ondansetron (ZOFRAN-ODT) 4 MG disintegrating tablet Take 1 tablet by mouth every 8 hours as needed for Nausea or Vomiting 9/30/22  Yes Amos Mtz MD   valACYclovir (VALTREX) 1 g tablet Take 1 g by mouth as needed 6/28/21  Yes Historical Provider, MD   aspirin 81 MG tablet Take 81 mg by mouth daily   Yes Historical Provider, MD   levothyroxine (SYNTHROID) 150 MCG tablet Take 150 mcg by mouth Daily   Yes Historical Provider, MD   EPINEPHrine (EPIPEN 2-SAYAD) 0.3 MG/0.3ML SOAJ injection Inject 0.3 mLs into the muscle once for 1 dose Use as directed for allergic reaction 10/28/22 12/19/22  Amos Mtz MD         Allergies   Allergen Reactions    Provigil [Modafinil] Dermatitis     Severe dermatitis     Past Medical History: Diagnosis Date    Hyperlipidemia     Hyperthyroidism     Kidney stone     Morbid obesity due to excess calories ([facility] [address]     Sarcoidosis     Unspecified sleep apnea        Past Surgical History:   Procedure Laterality Date    CARPAL TUNNEL RELEASE Bilateral     HERNIA REPAIR  08/2021    KNEE SURGERY      x2 last 2/2015    CATHI-EN-Y GASTRIC BYPASS N/A 10/18/2022    GASTRIC BYPASS CATHI-EN-Y LAPAROSCOPIC ROBOTIC XI performed by Bruno Apodaca MD at [address] [facility] [address]  04/29/2022       Current Outpatient Medications   Medication Sig Dispense Refill    omeprazole (PRILOSEC) 20 MG delayed release capsule Take 1 capsule by mouth Daily 30 capsule 12    ondansetron (ZOFRAN-ODT) 4 MG disintegrating tablet Take 1 tablet by mouth every 8 hours as needed for Nausea or Vomiting 15 tablet 0    valACYclovir (VALTREX) 1 g tablet Take 1 g by mouth as needed      aspirin 81 MG tablet Take 81 mg by mouth daily      levothyroxine (SYNTHROID) 150 MCG tablet Take 150 mcg by mouth Daily      EPINEPHrine (EPIPEN 2-SAYDA) 0.3 MG/0.3ML SOAJ injection Inject 0.3 mLs into the muscle once for 1 dose Use as directed for allergic reaction 0.3 mL 2     No current facility-administered medications for this visit. Allergies   Allergen Reactions    Provigil [Modafinil] Dermatitis     Severe dermatitis       Family History   Problem Relation Age of Onset    Cancer Mother         lung    Cancer Father         colon    Cancer Maternal Grandmother         lung       Social History     Socioeconomic History    Marital status:      Spouse name: Not on file    Number of children: Not on file    Years of education: Not on file    Highest education level: Not on file   Occupational History    Occupation:  self employed   Tobacco Use    Smoking status: Never    Smokeless tobacco: Never   Vaping Use    Vaping Use: Never used   Substance and Sexual Activity    Alcohol use:  Yes     Alcohol/week: 0.0 standard drinks     Comment: socially    Drug use: No    Sexual activity: Not Currently     Partners: Female   Other Topics Concern    Not on file   Social History Narrative    Not on file     Social Determinants of Health     Financial Resource Strain: Not on file   Food Insecurity: Not on file   Transportation Needs: Not on file   Physical Activity: Not on file   Stress: Not on file   Social Connections: Not on file   Intimate Partner Violence: Not on file   Housing Stability: Not on file           A complete 10 system review was performed and are otherwise negative unless mentioned in the above HPI. Specific negatives are listed below but may not include all those reviewed.     General ROS: negative obtundation, AMS  ENT ROS: negative rhinorrhea, epistaxis  Allergy and Immunology ROS: negative itchy/watery eyes or nasal congestion  Hematological and Lymphatic ROS: negative spontaneous bleeding or bruising  Endocrine ROS: negative  lethargy, mood swings, palpitations or polydipsia/polyuria  Respiratory ROS: negative sputum changes, stridor, tachypnea or wheezing  Cardiovascular ROS: negative for - loss of consciousness, murmur or orthopnea  Gastrointestinal ROS: negative for - hematochezia or hematemesis  Genito-Urinary ROS: negative for -  genital discharge or hematuria  Musculoskeletal ROS: negative for - focal weakness, gangrene  Psych/Neuro ROS: negative for - visual or auditory hallucinations, suicidal ideation    Physical exam:   /77 (Site: Left Lower Arm, Position: Sitting, Cuff Size: Large Adult)   Pulse 77   Temp 97.7 °F (36.5 °C) (Temporal)   Resp 20   Ht 6' (1.829 m)   Wt 266 lb (120.7 kg)   BMI 36.08 kg/m²   General appearance: AAO, NAD  Eyes: PERRL, EOMI, red conjunctiva  Lungs: Equal chest rise bilateral  Chest wall: atraumatic, no tenderness, no echymosis or abrasions  Heart: reg rate, no murmur  Abdomen: soft, nondistended, tender minimal, no hernias, no peritioneal signs  Extremities: full ROM all 4 ext, no gross motor or sensory deficits  Pulses: 2+ distal  Skin: warm and dry  Neurologic: spontanous eye opening, purposeful, follows complex commands  Psych: No tremor, no suicidal ideation, no hallucinations      Assessment: He is 3 months post Laparoscopic Lisa-en- Y Gastric Bypass. Plan:  Doing well, he is taking in [address]. We discussed extensively he needs to be more adherent to the diet and increase his protein intake. We discussed options as far as avoiding simple sugars. We also discussed dietary choices with regards to [facility] and anahy on New Year's or not advised. We also discussed exercise regimen needs to increase significantly. Currently is not doing any regular physical exercise and I advised at least 30 minutes of walking 3 to 5 days a week. Also discussed resistance band training is very important for muscle strength and tone. Continue eating a soft, high protein, low calorie diet. Eat small portions very slowly and chew well before swallowing. Drink plenty of water,  maintain adequate variety and balance, pre-plan meals, and increase intake of: proteins. Try to exercise more, at least 30 minutes per day, 7 days per week. Follow up in 3 months and contact me if questions arise in the meantime. Start using fiber therapy such as Metamucil twice a day to prevent constipation. Bowels must move every day.     Lactulose for constipation  Miralax TID  MOM once daily for 2 days      Physician Signature: Electronically signed by Dr. Lavera Baumgarten, MD

## 2023-01-13 NOTE — PROGRESS NOTES
Medical Nutrition Therapy (MNT) Assessment     Pt. Name: Mark Gatica   Date:1/13/2023  F/U Appt: Sx Type 3 mos rygb      Ht 6' (1.829 m)   Wt 266 lb (120.7 kg)   BMI 36.08 kg/m²  Height: 6' (1.829 m) Weight: 266 lb (120.7 kg)   IBW:ideal body weight   188 lbs % EBWL: 33%     Wt Readings from Last 3 Encounters:   01/13/23 266 lb (120.7 kg)   01/13/23 266 lb (120.7 kg)   12/19/22 271 lb (122.9 kg)                     Protein supplements:   Daily Protein needs (based on 1.0 gram per kg of IBW)  85-95  grams       Subjective:                    MNT ADVANCE TO:     Bariatric soft diet introducing raw fruit, raw vegetables, rice, protein bars, multivitamin, calcium, protein supplements          [address] [place] Bowel Protocol:  no - Diarrhea  Yes - Constipation  Dr Costa Record addressed w/ pt  How much plain water are you drinking daily 64 oz            How many meals a day 5-6 / Portions Sizes of Meals are usually 3-4 oz - pt admitted to eating a whole slice of pizza and some candy recently         1/3/23 Labs: B12 1490 H    Supplements: Bariatric Advantage Multi-Vitamin 1 tablet 2 times Daily, Bariatric Advantage 29 mgs Iron 1 tablet Daily, Bariatric Advanatge Calcium Lozenges 1 tablet 3 times Daily , Dry Vitamin D3 5,000iu every day      Estimated Daily Nutritional Needs: Based on Bariatric procedure for Wt. Loss  Energy: Will be calculated at Maintenance Stage    Protein: 60 - 80 gms Daily    MNT Plan and Additional Education: RD/LD reviewed Bariatric Soft Diet incorporating in Raw Fruits, Raw Vegetables, Red Meat, and Rice. Encouraged pt to meet protein and fluid needs daily. Handouts given. Pt. verbalized understanding. Pt instructed to call with questions.      MEDICAL NUTRITION THERAPY (MNT) - Nutrition Care Plan   (The patient has been educated and given written education material that reinforces the following dietary guidelines for Bariatric Surgery)  Goal:  Patient able to verbalize the following dietary concepts:  3 to 4 ounce portions per meal     6 small meals daily      60 to 80 grams of protein   48 to 64 ounces of fluid daily (water)     Always consume protein item first with all meals   Pt is aware wt loss is pt controlled. Slow Meals - 30-35 chews per bite / Meals 30 - 45 minutes long            The RD / LD reviewed with the patient that the dietary goals of the bariatric patient is to ensure that patient is able to meet established nutritional needs for a Bariatric Surgery  Patient at this time in order to promote healing, prevent significant weight changes, prevent skin breakdown and abnormal lab values, prevent complications, and tolerance to diet and texture of foods. Pt is able to identify proper food choices and needed changes and is able to explain proper food preparation. RD / LD reviewed compliance with assigned diet stages, volume of food and drink consumed, timing of meals, amount of protein and energy intake, daily vitamin and mineral supplementation, identify food cravings and any adverse reactions associated with intake of food and drink. RD / LD uses behavioral tools such as goal setting, MI, and self-monitoring, to reinforce the anatomic and physiologic effects of the surgery, so that the described behaviors become more of a habit not simply a reaction to the altered anatomy. Care Plan:   Rd/Ld Addressed Food Records or 24-Hour Recall  Rd / Ld encouraged patient to exercise at least 30 minutes daily  Rd / Ld instructed patient on how to increase oral protein intake within the diet. Pt. can verbalize he / she will need to consume 60 - 80 grams. Rd / Ld instructed the patient on how to increase the use of protein supplements within the diet. Pt. was instructed on how to increase water intake. Patient will need to consume 48 - 64 oz. of just plain water in addition to 30 oz. of non-caloric beverages.   Handouts given to patient  RD / LD encouraged oral intake    RD / LD encouraged pt. to keep food records.       Pt. is able to verbalize diet concepts         Yes - Pt. diet was advanced to the following stage ( See above)     Poor - Dietary Compliance

## 2023-02-13 DIAGNOSIS — Z98.890 PONV (POSTOPERATIVE NAUSEA AND VOMITING): ICD-10-CM

## 2023-02-13 DIAGNOSIS — R11.2 PONV (POSTOPERATIVE NAUSEA AND VOMITING): ICD-10-CM

## 2023-02-13 RX ORDER — ONDANSETRON 4 MG/1
4 TABLET, ORALLY DISINTEGRATING ORAL EVERY 8 HOURS PRN
Qty: 15 TABLET | Refills: 0 | Status: SHIPPED | OUTPATIENT
Start: 2023-02-13

## 2023-04-17 ENCOUNTER — OFFICE VISIT (OUTPATIENT)
Dept: BARIATRICS/WEIGHT MGMT | Age: 63
End: 2023-04-17
Payer: COMMERCIAL

## 2023-04-17 ENCOUNTER — INITIAL CONSULT (OUTPATIENT)
Dept: BARIATRICS/WEIGHT MGMT | Age: 63
End: 2023-04-17

## 2023-04-17 VITALS
SYSTOLIC BLOOD PRESSURE: 132 MMHG | BODY MASS INDEX: 34.13 KG/M2 | WEIGHT: 252 LBS | HEART RATE: 60 BPM | TEMPERATURE: 97.3 F | RESPIRATION RATE: 20 BRPM | HEIGHT: 72 IN | DIASTOLIC BLOOD PRESSURE: 75 MMHG

## 2023-04-17 VITALS — HEIGHT: 72 IN | WEIGHT: 252 LBS | BODY MASS INDEX: 34.13 KG/M2

## 2023-04-17 DIAGNOSIS — Z71.3 DIETARY COUNSELING: Primary | ICD-10-CM

## 2023-04-17 DIAGNOSIS — K91.2 MALNUTRITION FOLLOWING GASTROINTESTINAL SURGERY: Primary | ICD-10-CM

## 2023-04-17 PROCEDURE — 99999 PR OFFICE/OUTPT VISIT,PROCEDURE ONLY: CPT

## 2023-04-17 PROCEDURE — 99213 OFFICE O/P EST LOW 20 MIN: CPT | Performed by: NURSE PRACTITIONER

## 2023-04-17 PROCEDURE — 99212 OFFICE O/P EST SF 10 MIN: CPT

## 2023-04-17 NOTE — PROGRESS NOTES
of protein   48 to 64 ounces of fluid daily (water)     Always consume protein item first with all meals   Pt is aware wt loss is pt controlled. Slow Meals - 30-35 chews per bite / Meals 30 - 45 minutes long            The RD / LD reviewed with the patient that the dietary goals of the bariatric patient is to ensure that patient is able to meet established nutritional needs for a Bariatric Surgery  Patient at this time in order to promote healing, prevent significant weight changes, prevent skin breakdown and abnormal lab values, prevent complications, and tolerance to diet and texture of foods. Pt is able to identify proper food choices and needed changes and is able to explain proper food preparation. RD / LD reviewed compliance with assigned diet stages, volume of food and drink consumed, timing of meals, amount of protein and energy intake, daily vitamin and mineral supplementation, identify food cravings and any adverse reactions associated with intake of food and drink. RD / LD uses behavioral tools such as goal setting, MI, and self-monitoring, to reinforce the anatomic and physiologic effects of the surgery, so that the described behaviors become more of a habit not simply a reaction to the altered anatomy. Care Plan:   Rd/Ld Addressed Food Records or 24-Hour Recall  Rd / Ld encouraged patient to exercise at least 30 minutes daily  Rd / Ld instructed patient on how to increase oral protein intake within the diet. Pt. can verbalize he / she will need to consume 60 - 80 grams. Rd / Ld instructed the patient on how to increase the use of protein supplements within the diet. Pt. was instructed on how to increase water intake. Patient will need to consume 48 - 64 oz. of just plain water in addition to 30 oz. of non-caloric beverages. Handouts given to patient  RD / LD encouraged oral intake    RD / LD encouraged pt. to keep food records.       Pt. is able to

## 2023-04-17 NOTE — PATIENT INSTRUCTIONS
Can start to wean off of omeprazole at this time. Please continue to take your vitamin and mineral supplements as instructed. If you received a blood work prescription today for laboratory monitoring due prior to your next routine follow-up visit, please have this blood work obtained 10 to 14 days prior to your next visit. It is important to fast for 12 hours prior to routine weight loss surgery blood work, EXCEPT for drinking water, to ensure accuracy of results. Please report nausea, vomiting, abdominal pain, or any other problems you experience to your surgeon. For problems related to weight loss surgery, it is best to go to [address] [place] [address] Emergency Department and have your surgeon paged.

## 2023-04-17 NOTE — PROGRESS NOTES
Socioeconomic History    Marital status:      Spouse name: Not on file    Number of children: Not on file    Years of education: Not on file    Highest education level: Not on file   Occupational History    Occupation:  self employed   Tobacco Use    Smoking status: Never    Smokeless tobacco: Never   Vaping Use    Vaping Use: Never used   Substance and Sexual Activity    Alcohol use: Yes     Alcohol/week: 0.0 standard drinks     Comment: socially    Drug use: No    Sexual activity: Not Currently     Partners: Female   Other Topics Concern    Not on file   Social History Narrative    Not on file     Social Determinants of Health     Financial Resource Strain: Not on file   Food Insecurity: Not on file   Transportation Needs: Not on file   Physical Activity: Not on file   Stress: Not on file   Social Connections: Not on file   Intimate Partner Violence: Not on file   Housing Stability: Not on file       A complete 10 system review was performed and are otherwise negative unless mentioned in the above HPI. Specific negatives are listed below but may not include all those reviewed.     General ROS: negative obtundation, AMS  ENT ROS: negative rhinorrhea, epistaxis  Allergy and Immunology ROS: negative itchy/watery eyes or nasal congestion  Hematological and Lymphatic ROS: negative spontaneous bleeding or bruising  Endocrine ROS: negative  lethargy, mood swings, palpitations or polydipsia/polyuria  Respiratory ROS: negative sputum changes, stridor, tachypnea or wheezing  Cardiovascular ROS: negative for - loss of consciousness, murmur or orthopnea  Gastrointestinal ROS: negative for - hematochezia or hematemesis  Genito-Urinary ROS: negative for -  genital discharge or hematuria  Musculoskeletal ROS: negative for - focal weakness, gangrene  Psych/Neuro ROS: negative for - visual or auditory hallucinations, suicidal ideation        Physical exam:   /75 (Site: Left Lower Arm, Position:

## 2023-04-19 ENCOUNTER — TELEPHONE (OUTPATIENT)
Dept: BARIATRICS/WEIGHT MGMT | Age: 63
End: 2023-04-19

## 2023-04-19 NOTE — TELEPHONE ENCOUNTER
Received message from patient that his PCP instructed him to stop taking Vit D due to his elevated levels.  I spoke with patient to let him know that I did get his message and will make note on his chart that he has stopped the Vit D

## 2023-04-27 ENCOUNTER — TELEPHONE (OUTPATIENT)
Dept: BARIATRICS/WEIGHT MGMT | Age: 63
End: 2023-04-27

## 2023-04-27 NOTE — TELEPHONE ENCOUNTER
----- Message from BOLIVAR Feliciano CNP sent at 4/27/2023  8:48 AM EDT -----  Labs look good, however I would have him decrease his vitamin d to 3 times per week, and if he is taking a vitamin b1 supplement he can stop that at this time.  We will repeat labs as scheduled

## 2023-04-27 NOTE — TELEPHONE ENCOUNTER
Called patient and let him know about decreasing his vitamin D, per patient he stopped it per request of him family physician Satinder Pollack. He is not taking an extra Vit B1. Will get f/up labs as requested.

## 2023-07-17 ENCOUNTER — OFFICE VISIT (OUTPATIENT)
Dept: BARIATRICS/WEIGHT MGMT | Age: 63
End: 2023-07-17
Payer: COMMERCIAL

## 2023-07-17 VITALS
RESPIRATION RATE: 20 BRPM | HEART RATE: 55 BPM | SYSTOLIC BLOOD PRESSURE: 111 MMHG | BODY MASS INDEX: 33.18 KG/M2 | DIASTOLIC BLOOD PRESSURE: 69 MMHG | TEMPERATURE: 98.1 F | WEIGHT: 245 LBS | HEIGHT: 72 IN

## 2023-07-17 DIAGNOSIS — K91.2 MALNUTRITION FOLLOWING GASTROINTESTINAL SURGERY: Primary | ICD-10-CM

## 2023-07-17 PROCEDURE — 99213 OFFICE O/P EST LOW 20 MIN: CPT | Performed by: NURSE PRACTITIONER

## 2023-07-17 PROCEDURE — 99211 OFF/OP EST MAY X REQ PHY/QHP: CPT

## 2023-07-17 NOTE — PATIENT INSTRUCTIONS
Please continue to take your vitamin and mineral supplements as instructed. If you received a blood work prescription today for laboratory monitoring due prior to your next routine follow-up visit, please have this blood work obtained 10 to 14 days prior to your next visit. It is important to fast for 12 hours prior to routine weight loss surgery blood work, EXCEPT for drinking water, to ensure accuracy of results. Please report nausea, vomiting, abdominal pain, or any other problems you experience to your surgeon. For problems related to weight loss surgery, it is best to go to [facility] Emergency Department and have your surgeon paged.

## 2023-09-29 DIAGNOSIS — K91.2 MALNUTRITION FOLLOWING GASTROINTESTINAL SURGERY: ICD-10-CM

## 2023-09-29 LAB — TSH SERPL DL<=0.05 MIU/L-ACNC: 0.85 UIU/ML (ref 0.27–4.2)

## 2023-10-02 LAB
ALBUMIN SERPL-MCNC: 4.3 G/DL (ref 3.5–5.2)
ALP BLD-CCNC: 93 U/L (ref 40–129)
ALT SERPL-CCNC: 16 U/L (ref 0–40)
ANION GAP SERPL CALCULATED.3IONS-SCNC: 13 MMOL/L (ref 7–16)
AST SERPL-CCNC: 22 U/L (ref 0–39)
BILIRUB SERPL-MCNC: 0.7 MG/DL (ref 0–1.2)
BUN BLDV-MCNC: 12 MG/DL (ref 6–23)
CALCIUM SERPL-MCNC: 9.8 MG/DL (ref 8.6–10.2)
CHLORIDE BLD-SCNC: 104 MMOL/L (ref 98–107)
CO2: 25 MMOL/L (ref 22–29)
CREAT SERPL-MCNC: 1 MG/DL (ref 0.7–1.2)
FERRITIN: 62 NG/ML
FOLATE: >20 NG/ML (ref 4.8–24.2)
GFR SERPL CREATININE-BSD FRML MDRD: >60 ML/MIN/1.73M2
GLUCOSE BLD-MCNC: 91 MG/DL (ref 74–99)
HCT VFR BLD CALC: 47.5 % (ref 37–54)
HEMOGLOBIN: 15.4 G/DL (ref 12.5–16.5)
MCH RBC QN AUTO: 29 PG (ref 26–35)
MCHC RBC AUTO-ENTMCNC: 32.4 G/DL (ref 32–34.5)
MCV RBC AUTO: 89.5 FL (ref 80–99.9)
PDW BLD-RTO: 13.7 % (ref 11.5–15)
PLATELET # BLD: 242 K/UL (ref 130–450)
PMV BLD AUTO: 10.2 FL (ref 7–12)
POTASSIUM SERPL-SCNC: 5.1 MMOL/L (ref 3.5–5)
PREALBUMIN: 22 MG/DL (ref 20–40)
RBC # BLD: 5.31 M/UL (ref 3.8–5.8)
SODIUM BLD-SCNC: 142 MMOL/L (ref 132–146)
TOTAL PROTEIN: 7.3 G/DL (ref 6.4–8.3)
VITAMIN B-12: 1202 PG/ML (ref 211–946)
VITAMIN D 25-HYDROXY: 88.6 NG/ML (ref 30–100)
WBC # BLD: 5.1 K/UL (ref 4.5–11.5)

## 2023-10-03 LAB
RETINYL PALMITATE: <0.02 MG/L (ref 0–0.1)
VITAMIN A LEVEL: 0.66 MG/L (ref 0.3–1.2)
VITAMIN A, INTERP: NORMAL
VITAMIN B1 WHOLE BLOOD: 188 NMOL/L (ref 70–180)

## 2023-10-04 LAB
COPPER: 113.5 UG/DL (ref 70–140)
ZINC: 82.1 UG/DL (ref 60–120)

## 2023-10-09 ENCOUNTER — HOSPITAL ENCOUNTER (OUTPATIENT)
Dept: SLEEP CENTER | Age: 63
Discharge: HOME | End: 2023-10-09
Payer: COMMERCIAL

## 2023-10-09 DIAGNOSIS — R63.4 WEIGHT LOSS: ICD-10-CM

## 2023-10-09 DIAGNOSIS — G47.30 SLEEP APNEA, UNSPECIFIED TYPE: ICD-10-CM

## 2023-10-09 DIAGNOSIS — G47.33 OSA TREATED WITH BIPAP: ICD-10-CM

## 2023-10-09 PROCEDURE — 95811 POLYSOM 6/>YRS CPAP 4/> PARM: CPT

## 2023-10-10 VITALS
OXYGEN SATURATION: 96 % | WEIGHT: 236 LBS | HEIGHT: 73 IN | HEART RATE: 63 BPM | SYSTOLIC BLOOD PRESSURE: 113 MMHG | DIASTOLIC BLOOD PRESSURE: 70 MMHG | BODY MASS INDEX: 31.28 KG/M2

## 2023-10-10 ASSESSMENT — SLEEP AND FATIGUE QUESTIONNAIRES
HOW LIKELY ARE YOU TO NOD OFF OR FALL ASLEEP WHILE SITTING AND TALKING TO SOMEONE: 1
HOW LIKELY ARE YOU TO NOD OFF OR FALL ASLEEP WHEN YOU ARE A PASSENGER IN A CAR FOR AN HOUR WITHOUT A BREAK: 1
HOW LIKELY ARE YOU TO NOD OFF OR FALL ASLEEP WHILE WATCHING TV: 2
HOW LIKELY ARE YOU TO NOD OFF OR FALL ASLEEP WHILE SITTING INACTIVE IN A PUBLIC PLACE: 1
ESS TOTAL SCORE: 10
HOW LIKELY ARE YOU TO NOD OFF OR FALL ASLEEP WHILE SITTING QUIETLY AFTER LUNCH WITHOUT ALCOHOL: 1
HOW LIKELY ARE YOU TO NOD OFF OR FALL ASLEEP IN A CAR, WHILE STOPPED FOR A FEW MINUTES IN TRAFFIC: 0
HOW LIKELY ARE YOU TO NOD OFF OR FALL ASLEEP WHILE LYING DOWN TO REST IN THE AFTERNOON WHEN CIRCUMSTANCES PERMIT: 2
HOW LIKELY ARE YOU TO NOD OFF OR FALL ASLEEP WHILE SITTING AND READING: 2

## 2023-10-12 NOTE — PROGRESS NOTES
was two. AROUSAL ANALYSIS:  There were 39 arousals/awakenings, the sleep  disruption index is 21; (normal less and 10). LIMB MOVEMENT SUMMARY:  There was one limb movement, the limb movement  index is normal.    OXYGEN SATURATION:  Average oxygen saturation while awake was 95%. Lowest saturation was 83%. The patient spent 4% of the time with  saturation less than 90%. HEART RATE SUMMARY:  Average heart rate while awake was 61 beats per  minute. Maximum heart rate during sleep was 79 beats per minute and  minimum heart rate during sleep was 46 beats per minute. MISCELLANEOUS:  Charlotte Sleepiness Scale score is 10/24. Snoring was  moderate. This was graded as 4 on a 1 to 10 scale and eliminated with  positive airway pressure. There was no apparent bruxism. IMPRESSION:  1. Known sleep apnea. 2.  Optimal titration with CPAP. 3.  Moderate snoring, eliminated with CPAP. 4.  Good oxygen saturation. 5.  No cardiac dysrhythmia. DISCUSSION:  This patient is known to have obstructive sleep apnea. The  patient underwent gastric bypass surgery and has lost 75 pounds. Based  on this, a repeat polysomnogram was appropriately ordered. Unfortunately, the patient still has an apnea/hypopnea index of 30,  which indicates severe disease. With titration of CPAP, which the  patient tolerated well, optimal results were achieved with elimination  of snoring, normalization of oxygen saturation, and normalization of the  apnea/hypopnea index. SUGGESTIONS:  1.  Dr. Gabriele Nick to discuss the results of study with the patient. 2.  The patient should have CPAP at 5 cm of water pressure. 3.  The patient should use a ResMed AirFit N30 nasal mask, size small  with heated humidification and EPR of three.         Addy Lynne MD  Diplomat of Sleep Medicine    D: 10/11/2023 16:44:43       T: 10/11/2023 16:48:36     [email or web address]  Job#: [id number]     Doc#: [id number]    CC:

## 2023-10-16 ENCOUNTER — OFFICE VISIT (OUTPATIENT)
Dept: BARIATRICS/WEIGHT MGMT | Age: 63
End: 2023-10-16
Payer: COMMERCIAL

## 2023-10-16 VITALS
HEART RATE: 68 BPM | RESPIRATION RATE: 20 BRPM | WEIGHT: 243 LBS | HEIGHT: 72 IN | SYSTOLIC BLOOD PRESSURE: 110 MMHG | DIASTOLIC BLOOD PRESSURE: 68 MMHG | TEMPERATURE: 98.2 F | BODY MASS INDEX: 32.91 KG/M2

## 2023-10-16 DIAGNOSIS — K21.9 GASTROESOPHAGEAL REFLUX DISEASE WITHOUT ESOPHAGITIS: ICD-10-CM

## 2023-10-16 DIAGNOSIS — E55.9 VITAMIN D DEFICIENCY: ICD-10-CM

## 2023-10-16 DIAGNOSIS — K91.2 MALNUTRITION FOLLOWING GASTROINTESTINAL SURGERY: Primary | ICD-10-CM

## 2023-10-16 PROCEDURE — 99211 OFF/OP EST MAY X REQ PHY/QHP: CPT

## 2023-10-16 PROCEDURE — 99213 OFFICE O/P EST LOW 20 MIN: CPT | Performed by: NURSE PRACTITIONER

## 2023-10-16 RX ORDER — OMEPRAZOLE 20 MG/1
20 CAPSULE, DELAYED RELEASE ORAL DAILY
Qty: 30 CAPSULE | Refills: 12 | Status: SHIPPED | OUTPATIENT
Start: 2023-10-16 | End: 2024-10-15

## 2023-10-16 NOTE — PROGRESS NOTES
Maria Fernanda Corbin  10/16/2023  Titus Dorsey    Lisa-en- Y Gastric Bypass  1 year Post-Operative Follow-up     Chief Complaint   Patient presents with    Follow Up After Procedure     1 yr LRYGB. Water intake is around 64 oz daily. Protein through shakes and foods. Vitamin intake is good. Bowel movements are good. Maria Fernanda Corbin is a 61 y.o. male who is 1 year  post Laparoscopic Lisa-en-Y Gastric Bypass surgery. Reports that he has had a stressful few months. Has been snacking on chips, crackers. Turning to food to help him deal with it. He is not having swallowing difficulty. Patient denies nausea and vomiting. Patient denies gastric reflux symptoms, does take an occasional omeprazole prn. Bowel movements are  normal per patient. Patient is  taking fiber supplements, which include fiber well 2 gummies per day. Patient is compliant most of the time with the iron supplement and multivitamins and calcium + Vit D. He is meeting fluid recommendations of at least 64 ounces per day and is meeting protein recommendations. He  is exercising:  cleaning out a house, also going to the gym . Loadrq=425 lb (110.2 kg)  Today's weight represents a total weight loss of 65 pounds since surgery with 0 pounds regained since the lowest weight. Prior to Admission medications    Medication Sig Start Date End Date Taking?  Authorizing Provider   ascorbic acid (VITAMIN C) 100 MG tablet Vitamin C Active April 25th, 2022 2:11pm 4/25/22  Yes Provider, MD Elsi   albuterol sulfate HFA (PROVENTIL;VENTOLIN;PROAIR) 108 (90 Base) MCG/ACT inhaler INHALE 2 PUFFS BY MOUTH EVERY 6 HOURS AS NEEDED FOR WHEEZING 4/18/23  Yes Holly Jeronimo MD   EPINEPHrine (EPIPEN 2-SAYDA) 0.3 MG/0.3ML SOAJ injection Inject 0.3 mLs into the muscle once for 1 dose Use as directed for allergic reaction 10/28/22 10/16/23 Yes Yas Zambrano MD   omeprazole (PRILOSEC) 20 MG delayed release capsule Take 1 capsule by mouth Daily

## 2024-02-09 ENCOUNTER — HOSPITAL ENCOUNTER (EMERGENCY)
Age: 64
Discharge: HOME | End: 2024-02-09
Payer: COMMERCIAL

## 2024-02-09 VITALS
OXYGEN SATURATION: 94 % | TEMPERATURE: 97.5 F | SYSTOLIC BLOOD PRESSURE: 121 MMHG | RESPIRATION RATE: 18 BRPM | HEART RATE: 98 BPM | DIASTOLIC BLOOD PRESSURE: 66 MMHG

## 2024-02-09 DIAGNOSIS — T78.40XA ALLERGIC REACTION, INITIAL ENCOUNTER: Primary | ICD-10-CM

## 2024-02-09 PROCEDURE — 6370000000 HC RX 637 (ALT 250 FOR IP): Performed by: NURSE PRACTITIONER

## 2024-02-09 PROCEDURE — 99211 OFF/OP EST MAY X REQ PHY/QHP: CPT

## 2024-02-09 PROCEDURE — 6360000002 HC RX W HCPCS: Performed by: NURSE PRACTITIONER

## 2024-02-09 PROCEDURE — 96372 THER/PROPH/DIAG INJ SC/IM: CPT

## 2024-02-09 RX ORDER — CETIRIZINE HYDROCHLORIDE 10 MG/1
10 TABLET ORAL DAILY
Qty: 30 TABLET | Refills: 0 | Status: SHIPPED | OUTPATIENT
Start: 2024-02-09

## 2024-02-09 RX ORDER — FLUOXETINE HYDROCHLORIDE 40 MG/1
40 CAPSULE ORAL DAILY
COMMUNITY

## 2024-02-09 RX ORDER — TRIAMCINOLONE ACETONIDE 1 MG/G
CREAM TOPICAL
Qty: 45 G | Refills: 0 | Status: SHIPPED | OUTPATIENT
Start: 2024-02-09

## 2024-02-09 RX ORDER — DIPHENHYDRAMINE HCL 25 MG
25 TABLET ORAL ONCE
Status: COMPLETED | OUTPATIENT
Start: 2024-02-09 | End: 2024-02-09

## 2024-02-09 RX ORDER — DEXAMETHASONE SODIUM PHOSPHATE 10 MG/ML
10 INJECTION, SOLUTION INTRAMUSCULAR; INTRAVENOUS ONCE
Status: COMPLETED | OUTPATIENT
Start: 2024-02-09 | End: 2024-02-09

## 2024-02-09 RX ORDER — METHYLPREDNISOLONE 4 MG/1
TABLET ORAL
Qty: 1 KIT | Refills: 0 | Status: SHIPPED | OUTPATIENT
Start: 2024-02-10 | End: 2024-02-15

## 2024-02-09 RX ADMIN — DIPHENHYDRAMINE HYDROCHLORIDE 25 MG: 25 TABLET ORAL at 11:05

## 2024-02-09 RX ADMIN — DEXAMETHASONE SODIUM PHOSPHATE 10 MG: 10 INJECTION INTRAMUSCULAR; INTRAVENOUS at 11:05

## 2024-02-09 NOTE — ED PROVIDER NOTES
Department of Emergency Medicine   ED  Encounter Note  Admit Date/RoomTime: 2024 10:45 AM  ED Room:     NAME: Sumeet Frazier  : 1960  MRN: [id number]     Chief Complaint:  Facial Swelling (PT has hx of allergic reactions hives and lower corner  of bilateral mouth is swelling )    History of Present Illness        Sumeet Frazier is a 63 y.o. old male who presents to the urgent careby private vehicle, for sudden onset of multiple hives and lower lip swelling after unknown exposure to  which began several hour(s) prior to arrival.  Since onset the symptoms have been stable and mild-moderate in severity. His symptoms are associated with body wide itching.  He denies any chest pain, shortness of breath, dizziness, difficulty breathing or swallowing.  He states he took a Mucinex last night however has taken it prior, home medicines include Prozac, albuterol, aspirin levothyroxine and omeprazole which she has all been on chronically.  He does not take any antihypertensives or ACE inhibitor's.    ROS   Pertinent positives and negatives are stated within HPI, all other systems reviewed and are negative.    Past Medical History:  has a past medical history of Hyperlipidemia, Hyperthyroidism, Kidney stone, Morbid obesity due to excess calories (HCC), Sarcoidosis, and Unspecified sleep apnea.    Surgical History:  has a past surgical history that includes knee surgery; Carpal tunnel release (Bilateral); Upper gastrointestinal endoscopy (2022); Tonsillectomy; hernia repair (2021); and Lisa-en-Y Gastric Bypass (N/A, 10/18/2022).    Social History:  reports that he has never smoked. He has never used smokeless tobacco. He reports current alcohol use. He reports that he does not use drugs.    Family History: family history includes Cancer in his father, maternal grandmother, and mother.     Allergies: Provigil [modafinil]    Physical Exam   Oxygen Saturation Interpretation: Normal.        ED Triage Vitals

## 2024-03-27 ENCOUNTER — HOSPITAL ENCOUNTER (EMERGENCY)
Age: 64
Discharge: HOME | End: 2024-03-27
Payer: COMMERCIAL

## 2024-03-27 VITALS
BODY MASS INDEX: 31.19 KG/M2 | SYSTOLIC BLOOD PRESSURE: 117 MMHG | TEMPERATURE: 98.1 F | RESPIRATION RATE: 18 BRPM | OXYGEN SATURATION: 97 % | HEART RATE: 81 BPM | DIASTOLIC BLOOD PRESSURE: 73 MMHG | WEIGHT: 230 LBS

## 2024-03-27 DIAGNOSIS — U07.1 COVID-19: Primary | ICD-10-CM

## 2024-03-27 LAB
INFLUENZA A BY PCR: NOT DETECTED
INFLUENZA B BY PCR: NOT DETECTED
SARS-COV-2 RDRP RESP QL NAA+PROBE: DETECTED
SPECIMEN DESCRIPTION: ABNORMAL
SPECIMEN SOURCE: NORMAL
STREP A, MOLECULAR: NEGATIVE

## 2024-03-27 PROCEDURE — 87502 INFLUENZA DNA AMP PROBE: CPT

## 2024-03-27 PROCEDURE — 87651 STREP A DNA AMP PROBE: CPT

## 2024-03-27 PROCEDURE — 87635 SARS-COV-2 COVID-19 AMP PRB: CPT

## 2024-03-27 PROCEDURE — 99211 OFF/OP EST MAY X REQ PHY/QHP: CPT

## 2024-03-27 ASSESSMENT — PAIN SCALES - GENERAL: PAINLEVEL_OUTOF10: 0

## 2024-03-27 ASSESSMENT — PAIN - FUNCTIONAL ASSESSMENT: PAIN_FUNCTIONAL_ASSESSMENT: 0-10

## 2024-03-27 NOTE — DISCHARGE INSTR - COC
CAD in native artery I25.10    S/P gastric bypass Z98.84    Morbid obesity (HCC) E66.01       Isolation/Infection:   Isolation            No Isolation          Patient Infection Status       Infection Onset Added Last Indicated Last Indicated By Review Planned Expiration Resolved Resolved By    COVID-19 03/27/24 03/27/24 03/27/24 COVID-19, Rapid 04/06/24 04/10/24                         Nurse Assessment:  Last Vital Signs: /73   Pulse 81   Temp 98.1 °F (36.7 °C)   Resp 18   Wt 104.3 kg (230 lb)   SpO2 97%   BMI 31.19 kg/m²     Last documented pain score (0-10 scale): Pain Level: 0  Last Weight:   Wt Readings from Last 1 Encounters:   03/27/24 104.3 kg (230 lb)     Mental Status:  {IP PT MENTAL STATUS:20030}    IV Access:  { JANICE IV ACCESS:993564934}    Nursing Mobility/ADLs:  Walking   {CHP DME ADLs:884559815}  Transfer  {CHP DME ADLs:382130144}  Bathing  {CHP DME ADLs:639033267}  Dressing  {CHP DME ADLs:681989516}  Toileting  {CHP DME ADLs:470734901}  Feeding  {CHP DME ADLs:183350649}  Med Admin  {P DME ADLs:152522689}  Med Delivery   { JANICE MED Delivery:045914606}    Wound Care Documentation and Therapy:  Incision 10/18/22 Abdomen Medial (Active)   Number of days: 526        Elimination:  Continence:   Bowel: {YES / NO:19727}  Bladder: {YES / NO:19727}  Urinary Catheter: {Urinary Catheter:101385972}   Colostomy/Ileostomy/Ileal Conduit: {YES / NO:19727}       Date of Last BM: ***  No intake or output data in the 24 hours ending 03/27/24 1350  No intake/output data recorded.    Safety Concerns:     { JANICE Safety Concerns:108859333}    Impairments/Disabilities:      { JANICE Impairments/Disabilities:083068552}    Nutrition Therapy:  Current Nutrition Therapy:   { JANICE Diet List:555350221}    Routes of Feeding: {CHP DME Other Feedings:193574255}  Liquids: {Slp liquid thickness:60403}  Daily Fluid Restriction: {CHP DME Yes amt example:101028573}  Last Modified Barium Swallow with Video (Video

## 2024-03-27 NOTE — ED PROVIDER NOTES
canals bilaterally.  Nose:   There is clear rhinorrhea and mucosal edema.  Sinuses: no Bilateral maxillary sinus tenderness.                    no Bilateral frontal sinus tenderness.  Mouth:  normal tongue and buccal mucosa.   Throat: mild erythema.  Airway Patent.  Neck:  Supple. No meningeal signs. There is no  preauricular, anterior cervical, and posterior cervical node tenderness.  Respiratory:   Breath sounds: Bilateral normal.  Lung sounds: normal.   CV:  Regular rate and rhythm, normal heart sounds, without pathological murmurs, ectopy, gallops, or rubs.  GI:  Abdomen Soft, nontender, good bowel sounds.  No firm or pulsatile mass.  Integument:  Normal turgor.  Warm, dry, without visible rash.  Neurological:  Oriented.  Motor functions intact.       Lab / Imaging Results   (All laboratory and radiology results have been personally reviewed by myself)  Labs:  Results for orders placed or performed during the hospital encounter of 03/27/24   COVID-19, Rapid    Specimen: Nasopharyngeal Swab   Result Value Ref Range    Specimen Description .NASOPHARYNGEAL SWAB     SARS-CoV-2, Rapid DETECTED (A) Not Detected   Influenza A+B, PCR    Specimen: Nasal   Result Value Ref Range    Influenza A by PCR Not Detected Not Detected    Influenza B by PCR Not Detected Not Detected   Rapid Strep Screen    Specimen: Throat   Result Value Ref Range    Source .THROAT SWAB     Strep A, Molecular NEGATIVE NEGATIVE       Imaging:  All Radiology results interpreted by Radiologist unless otherwise noted.  No orders to display       ED Course / Medical Decision Making   Medications - No data to display       Medical Decision Making:   Sumeet Frazier presented to  with complaints of Pharyngitis and Fatigue    With low suspicion for pneumonia as per history/physical findings, imaging was not done not hypoxic, lungs clear to auscultation bilaterally.      With moderate suspicion for COVID-19, influenza, strep testing was obtained and were

## 2024-04-03 ENCOUNTER — OFFICE VISIT (OUTPATIENT)
Dept: BARIATRICS/WEIGHT MGMT | Age: 64
End: 2024-04-03
Payer: COMMERCIAL

## 2024-04-03 VITALS
DIASTOLIC BLOOD PRESSURE: 64 MMHG | WEIGHT: 239 LBS | HEIGHT: 72 IN | HEART RATE: 94 BPM | BODY MASS INDEX: 32.37 KG/M2 | SYSTOLIC BLOOD PRESSURE: 118 MMHG

## 2024-04-03 DIAGNOSIS — K91.2 MALNUTRITION FOLLOWING GASTROINTESTINAL SURGERY: Primary | ICD-10-CM

## 2024-04-03 DIAGNOSIS — K21.9 GASTROESOPHAGEAL REFLUX DISEASE WITHOUT ESOPHAGITIS: ICD-10-CM

## 2024-04-03 PROCEDURE — 99211 OFF/OP EST MAY X REQ PHY/QHP: CPT

## 2024-04-03 PROCEDURE — 99214 OFFICE O/P EST MOD 30 MIN: CPT | Performed by: NURSE PRACTITIONER

## 2024-04-03 NOTE — PATIENT INSTRUCTIONS
Zepbound - injectable    Please continue to take your vitamin and mineral supplements as instructed.      If you received a blood work prescription today for laboratory monitoring due prior to your next routine follow-up visit, please have this blood work obtained 10 to 14 days prior to your next visit.  It is important to fast for 12 hours prior to routine weight loss surgery blood work, EXCEPT for drinking water, to ensure accuracy of results.    Please report nausea, vomiting, abdominal pain, or any other problems you experience to your surgeon.  For problems related to weight loss surgery, it is best to go to [facility] Emergency Department and have your surgeon paged.    Last Surgical Weight Loss:      10/16/2023    10:30 AM 4/3/2024     8:04 AM   Surgical Weight Loss Tracker   Consult Date 9/2/2022 9/2/2022   Initial Height 6' 0\" 6' 0\"   Initial Weight 315 lb 315 lb   Initial BMI 42.72 42.72   Ideal Body Weight 188 lb 188 lb   Surgery Date 10/18/2022 10/18/2022   Pre-Surgical Height 6' 0\" 6' 0\"   Pre-Surgical Weight 308 lb 308 lb   Pre Surgery BMI 41.77 41.77   Weight to Lose 120 lb 120 lb   Date 10/16/2023 4/3/2024   Height 6' 0\" 6' 0\"   Weight 243 lb 239 lb   BMI 32.95 32.41   Weight Change -65 lb -4 lb   Total Weight Change -65 lb -69 lb   % EBWL 54% 58%

## 2024-04-03 NOTE — PROGRESS NOTES
Sumeet Frazier  4/3/2024  [facility]    Lisa-en- Y Gastric Bypass  18 months Post-Operative Follow-up     Chief Complaint   Patient presents with    Bariatrics Post Op Follow Up     LRYGB 18 m po. Water intake 35oz daily. Protein through shakes and food. Vitamins daily. Normal bm       Sumeet Frazier is a 64 y.o. male who is 18 months post Laparoscopic Lisa-en-Y Gastric Bypass surgery.  Reports that he has been having increased heartburn. This has been going on for the past 4-5 weeks. He takes omeprazole every other day.  He is not having swallowing difficulty. Patient denies nausea and vomiting.  Bowel movements are normal per patient. Patient is taking fiber supplements, which include 2 fiberwell gummies.     Patient is compliant most of the time with the iron supplement, vitamin c, and multivitamins and calcium. He is not meeting fluid recommendations of at least 64 ounces per day, has been drinking more decaf coffee, and is meeting protein recommendations. He  is exercising:  goes to the gym 3 days per week .     Last Surgical Weight Loss:      10/16/2023    10:30 AM 4/3/2024     8:04 AM   Surgical Weight Loss Tracker   Consult Date 9/2/2022 9/2/2022   Initial Height 6' 0\" 6' 0\"   Initial Weight 315 lb 315 lb   Initial BMI 42.72 42.72   Ideal Body Weight 188 lb 188 lb   Surgery Date 10/18/2022 10/18/2022   Pre-Surgical Height 6' 0\" 6' 0\"   Pre-Surgical Weight 308 lb 308 lb   Pre Surgery BMI 41.77 41.77   Weight to Lose 120 lb 120 lb   Date 10/16/2023 4/3/2024   Height 6' 0\" 6' 0\"   Weight 243 lb 239 lb   BMI 32.95 32.41   Weight Change -65 lb -4 lb   Total Weight Change -65 lb -69 lb   % EBWL 54% 58%       Weight=108.4 kg (239 lb)  Today's weight represents a total weight loss of 69 pounds since surgery with 0 pounds regained since the lowest weight.    Prior to Admission medications    Medication Sig Start Date End Date Taking? Authorizing Provider   FLUoxetine (PROZAC) 40 MG capsule

## 2024-05-06 ENCOUNTER — HOSPITAL ENCOUNTER (OUTPATIENT)
Age: 64
Setting detail: OUTPATIENT SURGERY
Discharge: HOME | End: 2024-05-06
Attending: OPHTHALMOLOGY | Admitting: OPHTHALMOLOGY
Payer: COMMERCIAL

## 2024-05-06 ENCOUNTER — ANESTHESIA (OUTPATIENT)
Dept: OPERATING ROOM | Age: 64
End: 2024-05-06
Payer: COMMERCIAL

## 2024-05-06 ENCOUNTER — PREP FOR PROCEDURE (OUTPATIENT)
Dept: OPHTHALMOLOGY | Age: 64
End: 2024-05-06

## 2024-05-06 ENCOUNTER — ANESTHESIA EVENT (OUTPATIENT)
Dept: OPERATING ROOM | Age: 64
End: 2024-05-06
Payer: COMMERCIAL

## 2024-05-06 VITALS
SYSTOLIC BLOOD PRESSURE: 122 MMHG | BODY MASS INDEX: 32.37 KG/M2 | DIASTOLIC BLOOD PRESSURE: 73 MMHG | WEIGHT: 239 LBS | RESPIRATION RATE: 16 BRPM | TEMPERATURE: 97 F | OXYGEN SATURATION: 96 % | HEART RATE: 72 BPM | HEIGHT: 72 IN

## 2024-05-06 PROCEDURE — 3700000000 HC ANESTHESIA ATTENDED CARE: Performed by: OPHTHALMOLOGY

## 2024-05-06 PROCEDURE — 2580000003 HC RX 258: Performed by: ANESTHESIOLOGY

## 2024-05-06 PROCEDURE — 7100000010 HC PHASE II RECOVERY - FIRST 15 MIN: Performed by: OPHTHALMOLOGY

## 2024-05-06 PROCEDURE — 6370000000 HC RX 637 (ALT 250 FOR IP): Performed by: OPHTHALMOLOGY

## 2024-05-06 PROCEDURE — 7100000011 HC PHASE II RECOVERY - ADDTL 15 MIN: Performed by: OPHTHALMOLOGY

## 2024-05-06 PROCEDURE — 2500000003 HC RX 250 WO HCPCS: Performed by: OPHTHALMOLOGY

## 2024-05-06 PROCEDURE — 3700000001 HC ADD 15 MINUTES (ANESTHESIA): Performed by: OPHTHALMOLOGY

## 2024-05-06 PROCEDURE — 6360000002 HC RX W HCPCS: Performed by: OPHTHALMOLOGY

## 2024-05-06 PROCEDURE — 3600000003 HC SURGERY LEVEL 3 BASE: Performed by: OPHTHALMOLOGY

## 2024-05-06 PROCEDURE — 6360000002 HC RX W HCPCS: Performed by: ANESTHESIOLOGY

## 2024-05-06 PROCEDURE — 2709999900 HC NON-CHARGEABLE SUPPLY: Performed by: OPHTHALMOLOGY

## 2024-05-06 PROCEDURE — 3600000013 HC SURGERY LEVEL 3 ADDTL 15MIN: Performed by: OPHTHALMOLOGY

## 2024-05-06 RX ORDER — SODIUM CHLORIDE 0.9 % (FLUSH) 0.9 %
5-40 SYRINGE (ML) INJECTION PRN
Status: DISCONTINUED | OUTPATIENT
Start: 2024-05-06 | End: 2024-05-06 | Stop reason: HOSPADM

## 2024-05-06 RX ORDER — SODIUM CHLORIDE 0.9 % (FLUSH) 0.9 %
5-40 SYRINGE (ML) INJECTION PRN
Status: CANCELLED | OUTPATIENT
Start: 2024-05-06

## 2024-05-06 RX ORDER — PROPARACAINE HYDROCHLORIDE 5 MG/ML
1 SOLUTION/ DROPS OPHTHALMIC ONCE
Status: COMPLETED | OUTPATIENT
Start: 2024-05-06 | End: 2024-05-06

## 2024-05-06 RX ORDER — SODIUM CHLORIDE 0.9 % (FLUSH) 0.9 %
5-40 SYRINGE (ML) INJECTION EVERY 12 HOURS SCHEDULED
Status: DISCONTINUED | OUTPATIENT
Start: 2024-05-06 | End: 2024-05-06 | Stop reason: HOSPADM

## 2024-05-06 RX ORDER — NEOMYCIN SULFATE, POLYMYXIN B SULFATE, AND DEXAMETHASONE 3.5; 10000; 1 MG/G; [USP'U]/G; MG/G
OINTMENT OPHTHALMIC PRN
Status: DISCONTINUED | OUTPATIENT
Start: 2024-05-06 | End: 2024-05-06 | Stop reason: ALTCHOICE

## 2024-05-06 RX ORDER — TROPICAMIDE 10 MG/ML
1 SOLUTION/ DROPS OPHTHALMIC SEE ADMIN INSTRUCTIONS
Status: CANCELLED | OUTPATIENT
Start: 2024-05-06

## 2024-05-06 RX ORDER — OFLOXACIN 3 MG/ML
1 SOLUTION/ DROPS OPHTHALMIC ONCE
Status: COMPLETED | OUTPATIENT
Start: 2024-05-06 | End: 2024-05-06

## 2024-05-06 RX ORDER — PHENYLEPHRINE HYDROCHLORIDE 25 MG/ML
1 SOLUTION/ DROPS OPHTHALMIC SEE ADMIN INSTRUCTIONS
Status: CANCELLED | OUTPATIENT
Start: 2024-05-06

## 2024-05-06 RX ORDER — TETRACAINE HYDROCHLORIDE 5 MG/ML
SOLUTION OPHTHALMIC PRN
Status: DISCONTINUED | OUTPATIENT
Start: 2024-05-06 | End: 2024-05-06 | Stop reason: ALTCHOICE

## 2024-05-06 RX ORDER — MIDAZOLAM HYDROCHLORIDE 1 MG/ML
INJECTION INTRAMUSCULAR; INTRAVENOUS PRN
Status: DISCONTINUED | OUTPATIENT
Start: 2024-05-06 | End: 2024-05-06 | Stop reason: SDUPTHER

## 2024-05-06 RX ORDER — SODIUM CHLORIDE 9 MG/ML
INJECTION, SOLUTION INTRAVENOUS PRN
Status: DISCONTINUED | OUTPATIENT
Start: 2024-05-06 | End: 2024-05-06 | Stop reason: HOSPADM

## 2024-05-06 RX ORDER — SODIUM CHLORIDE, SODIUM LACTATE, POTASSIUM CHLORIDE, CALCIUM CHLORIDE 600; 310; 30; 20 MG/100ML; MG/100ML; MG/100ML; MG/100ML
INJECTION, SOLUTION INTRAVENOUS CONTINUOUS
Status: DISCONTINUED | OUTPATIENT
Start: 2024-05-06 | End: 2024-05-06 | Stop reason: HOSPADM

## 2024-05-06 RX ORDER — FENTANYL CITRATE 50 UG/ML
INJECTION, SOLUTION INTRAMUSCULAR; INTRAVENOUS PRN
Status: DISCONTINUED | OUTPATIENT
Start: 2024-05-06 | End: 2024-05-06 | Stop reason: SDUPTHER

## 2024-05-06 RX ORDER — BALANCED SALT SOLUTION 6.4; .75; .48; .3; 3.9; 1.7 MG/ML; MG/ML; MG/ML; MG/ML; MG/ML; MG/ML
SOLUTION OPHTHALMIC PRN
Status: DISCONTINUED | OUTPATIENT
Start: 2024-05-06 | End: 2024-05-06 | Stop reason: ALTCHOICE

## 2024-05-06 RX ORDER — SODIUM CHLORIDE 9 MG/ML
INJECTION, SOLUTION INTRAVENOUS PRN
Status: CANCELLED | OUTPATIENT
Start: 2024-05-06

## 2024-05-06 RX ORDER — PHENYLEPHRINE HYDROCHLORIDE 25 MG/ML
1 SOLUTION/ DROPS OPHTHALMIC
Status: COMPLETED | OUTPATIENT
Start: 2024-05-06 | End: 2024-05-06

## 2024-05-06 RX ORDER — TROPICAMIDE 10 MG/ML
1 SOLUTION/ DROPS OPHTHALMIC
Status: COMPLETED | OUTPATIENT
Start: 2024-05-06 | End: 2024-05-06

## 2024-05-06 RX ORDER — SODIUM CHLORIDE 0.9 % (FLUSH) 0.9 %
5-40 SYRINGE (ML) INJECTION EVERY 12 HOURS SCHEDULED
Status: CANCELLED | OUTPATIENT
Start: 2024-05-06

## 2024-05-06 RX ORDER — PROPARACAINE HYDROCHLORIDE 5 MG/ML
1 SOLUTION/ DROPS OPHTHALMIC ONCE
Status: CANCELLED | OUTPATIENT
Start: 2024-05-06

## 2024-05-06 RX ORDER — OFLOXACIN 3 MG/ML
1 SOLUTION/ DROPS OPHTHALMIC ONCE
Status: CANCELLED | OUTPATIENT
Start: 2024-05-06 | End: 2024-05-06

## 2024-05-06 RX ADMIN — MIDAZOLAM 2 MG: 1 INJECTION INTRAMUSCULAR; INTRAVENOUS at 12:51

## 2024-05-06 RX ADMIN — SODIUM CHLORIDE, POTASSIUM CHLORIDE, SODIUM LACTATE AND CALCIUM CHLORIDE: 600; 310; 30; 20 INJECTION, SOLUTION INTRAVENOUS at 12:05

## 2024-05-06 RX ADMIN — OFLOXACIN 1 DROP: 3 SOLUTION OPHTHALMIC at 11:50

## 2024-05-06 RX ADMIN — FENTANYL CITRATE 25 MCG: 50 INJECTION, SOLUTION INTRAMUSCULAR; INTRAVENOUS at 12:57

## 2024-05-06 RX ADMIN — PHENYLEPHRINE HYDROCHLORIDE 1 DROP: 25 SOLUTION/ DROPS OPHTHALMIC at 11:55

## 2024-05-06 RX ADMIN — FENTANYL CITRATE 25 MCG: 50 INJECTION, SOLUTION INTRAMUSCULAR; INTRAVENOUS at 13:16

## 2024-05-06 RX ADMIN — PHENYLEPHRINE HYDROCHLORIDE 1 DROP: 25 SOLUTION/ DROPS OPHTHALMIC at 12:00

## 2024-05-06 RX ADMIN — PROPARACAINE HYDROCHLORIDE 1 DROP: 5 SOLUTION/ DROPS OPHTHALMIC at 11:50

## 2024-05-06 RX ADMIN — TROPICAMIDE 1 DROP: 10 SOLUTION/ DROPS OPHTHALMIC at 12:00

## 2024-05-06 RX ADMIN — PHENYLEPHRINE HYDROCHLORIDE 1 DROP: 25 SOLUTION/ DROPS OPHTHALMIC at 11:50

## 2024-05-06 RX ADMIN — FENTANYL CITRATE 50 MCG: 50 INJECTION, SOLUTION INTRAMUSCULAR; INTRAVENOUS at 12:54

## 2024-05-06 RX ADMIN — TROPICAMIDE 1 DROP: 10 SOLUTION/ DROPS OPHTHALMIC at 11:55

## 2024-05-06 RX ADMIN — TROPICAMIDE 1 DROP: 10 SOLUTION/ DROPS OPHTHALMIC at 11:50

## 2024-05-06 ASSESSMENT — PAIN - FUNCTIONAL ASSESSMENT
PAIN_FUNCTIONAL_ASSESSMENT: NONE - DENIES PAIN
PAIN_FUNCTIONAL_ASSESSMENT: 0-10

## 2024-05-06 ASSESSMENT — ENCOUNTER SYMPTOMS: SHORTNESS OF BREATH: 1

## 2024-05-06 NOTE — DISCHARGE INSTRUCTIONS
do at home to prevent nausea and feel better.  The doctor has checked you carefully, but problems can develop later. If you notice any problems or new symptoms, get medical treatment right away.  Follow-up care is a key part of your treatment and safety. Be sure to make and go to all appointments, and call your doctor if you are having problems. It's also a good idea to know your test results and keep a list of the medicines you take.  How can you care for yourself at home?  Be safe with medicines. Read and follow all instructions on the label.  If the doctor gave you a prescription medicine for pain, take it as prescribed.  If you are not taking a prescription pain medicine, ask your doctor if you can take an over-the-counter medicine.  Take your pain medicine as soon as you have pain. It works better if you take it before the pain gets bad.  Call your doctor if you have any problems with your medicine.  Rest in bed until you feel better.  To prevent dehydration, drink plenty of fluids. Choose water and other clear liquids until you feel better. If you have kidney, heart, or liver disease and have to limit fluids, talk with your doctor before you increase the amount of fluids you drink.  When you are able to eat, try clear soups, mild foods, and liquids until all symptoms are gone for 12 to 48 hours. Other good choices include dry toast, crackers, cooked cereal, and gelatin dessert, such as Jell-O.  Do not smoke. Smoking and being around smoke can make nausea worse. If you need help quitting, talk to your doctor about stop-smoking programs and medicines. These can increase your chances of quitting for good.  When should you call for help?   Call 911  anytime you think you may need emergency care. For example, call if:    You passed out (lost consciousness).   Call your doctor now or seek immediate medical care if:    You have new or worse nausea or vomiting.     You are too sick to your stomach to drink any fluids.

## 2024-05-06 NOTE — ANESTHESIA PRE PROCEDURE
Department of Anesthesiology  Preprocedure Note       Name:  Sumeet Frazier   Age:  64 y.o.  :  1960                                          MRN:  [id number]         Date:  2024      Surgeon: Surgeon(s):  Serge Swift MD    Procedure: Procedure(s):  RIGHT EYE PARS PLANA VITRECTOMY ENDOLASER GAS FLUID EXCHANGE 25G    Medications prior to admission:   Prior to Admission medications    Medication Sig Start Date End Date Taking? Authorizing Provider   Cholecalciferol (VITAMIN D3) 125 MCG (5000 UT) TABS Take by mouth Twice a week   Yes Elsi Saez MD   FLUoxetine (PROZAC) 40 MG capsule Take 1 capsule by mouth daily    Elsi Saez MD   omeprazole (PRILOSEC) 20 MG delayed release capsule Take 1 capsule by mouth Daily 10/16/23 10/15/24  Remedios Cash, APRN - CNP   ascorbic acid (VITAMIN C) 100 MG tablet Vitamin C Active 2022 2:11pm 22   Elsi Saez MD   albuterol sulfate HFA (PROVENTIL;VENTOLIN;PROAIR) 108 (90 Base) MCG/ACT inhaler INHALE 2 PUFFS BY MOUTH EVERY 6 HOURS AS NEEDED FOR WHEEZING 23   Angelique Jeronimo MD   EPINEPHrine (EPIPEN 2-SAYDA) 0.3 MG/0.3ML SOAJ injection Inject 0.3 mLs into the muscle once for 1 dose Use as directed for allergic reaction 10/28/22 5/3/24  Rikki Lincoln MD   valACYclovir (VALTREX) 1 g tablet Take 1 tablet by mouth as needed 21   Elsi Saez MD   aspirin 81 MG tablet Take 1 tablet by mouth daily    Elsi Saez MD   levothyroxine (SYNTHROID) 150 MCG tablet Take 1 tablet by mouth Daily    Elsi Saez MD       Current medications:    Current Facility-Administered Medications   Medication Dose Route Frequency Provider Last Rate Last Admin    lactated ringers IV soln infusion   IntraVENous Continuous Zoila Hathaway  mL/hr at 24 1205 New Bag at 24 1205    sodium chloride flush 0.9 % injection 5-40 mL  5-40 mL IntraVENous 2 times per day Serge Swift MD

## 2024-05-06 NOTE — H&P
I have examined the patient and reviewed the history and physical from 05/03/2024 and find no relevant changes.    I have reviewed with the patient and/or family the risks, benefits, and alternatives to the procedure and they have agreed to proceed.    Serge Swift MD

## 2024-05-06 NOTE — OP NOTE
Operative Note      Patient: Sumeet Frazier  YOB: 1960  MRN: [id number]    Date of Procedure: 5/6/2024    Surgeon(s):  Serge Swift MD    SURGEON: Serge Swift MD    ASSISTANT: Yolanda Cuba PA-C    ANESTHESIA: MAC with retrobulbar block    PREOPERATIVE DIAGNOSIS:  1. Macula-off rhegmatogenous retinal detachment, right eye    POSTOPERATIVE DIAGNOSIS:  1. Macula-off rhegmatogenous retinal detachment, right eye    PROCEDURE:   1. 25-Gauge pars plana vitrectomy, right eye  2. Retinal detachment repair, right eye  3. Fluid-air exchange, right eye  4. Endolaser photocoagulation, right eye  5. Gas tamponade C3F8 15%, right eye    COMPLICATIONS: None    BLOOD LOSS: < 5 cc    OPERATIVE COURSE: After history, physical, and consent was obtained, the patient was taken to the operating room and given a bolus of IV sedating medication.  The patient then received a retrobulbar block of 1:1 Lidocaine and Marcaine to the right eye. The eye was then prepped and draped in a sterile manner and lid speculum was inserted.    A transscleral cannula was inserted in the infero-temporal quadrant 4 mm posterior to the limbus and a self-retaining infusion cannula was inserted. Additional cannulas were placed at the 2 and 10 o’clock positions. Using a light pipe and a 25-gauge high-speed vitreous cutter, a core vitrectomy was performed.  A posterior vitreous detachment was already present and confirmed with aspiration.  A peripheral vitrectomy was then performed for 360 degrees using shaving mode. Sub-retinal fluid extended from 9 o’clock to 5 o’clock including the central macula.  Retinal tears were identified at 12:00, 6:00, 9:00, 10:00, and they were marked with intraocular cautery.  There were also several retinal tags superotemporally and inferiorly. Scleral depression was performed for 360 degrees to ensure there were no other retinal tears. A drainage retinotomy was made posteriorly, and a complete fluid-air exchange was

## 2024-05-06 NOTE — ANESTHESIA POSTPROCEDURE EVALUATION
Department of Anesthesiology  Postprocedure Note    Patient: Sumeet Frazier  MRN: [id number]  YOB: 1960  Date of evaluation: 5/6/2024    Procedure Summary       Date: 05/06/24 Room / Location: [address]    Anesthesia Start: 1250 Anesthesia Stop: 1338    Procedure: RIGHT EYE PARS PLANA VITRECTOMY ENDOLASER GAS FLUID EXCHANGE 25G (Right: Eye) Diagnosis:       Rhegmatogenous retinal detachment of right eye      (Rhegmatogenous retinal detachment of right eye [H33.001])    Surgeons: Serge Swift MD Responsible Provider: Zoila Hathaway DO    Anesthesia Type: MAC ASA Status: 3            Anesthesia Type: No value filed.    Kofi Phase I: Kofi Score: 10    Kofi Phase II: Kofi Score: 10    Anesthesia Post Evaluation    Patient location during evaluation: PACU  Patient participation: complete - patient participated  Level of consciousness: awake  Pain score: 2  Airway patency: patent  Nausea & Vomiting: no nausea  Cardiovascular status: blood pressure returned to baseline  Respiratory status: acceptable  Hydration status: euvolemic  Pain management: adequate    No notable events documented.

## 2024-09-25 DIAGNOSIS — K91.2 MALNUTRITION FOLLOWING GASTROINTESTINAL SURGERY: ICD-10-CM

## 2024-09-25 LAB
ALBUMIN: 4.4 G/DL (ref 3.5–5.2)
ALP BLD-CCNC: 96 U/L (ref 40–129)
ALT SERPL-CCNC: 13 U/L (ref 0–40)
ANION GAP SERPL CALCULATED.3IONS-SCNC: 17 MMOL/L (ref 7–16)
AST SERPL-CCNC: 23 U/L (ref 0–39)
BILIRUB SERPL-MCNC: 0.6 MG/DL (ref 0–1.2)
BUN BLDV-MCNC: 14 MG/DL (ref 6–23)
CALCIUM SERPL-MCNC: 9.5 MG/DL (ref 8.6–10.2)
CHLORIDE BLD-SCNC: 104 MMOL/L (ref 98–107)
CO2: 20 MMOL/L (ref 22–29)
CREAT SERPL-MCNC: 1 MG/DL (ref 0.7–1.2)
FERRITIN: 116 NG/ML
FOLATE: >20 NG/ML (ref 4.8–24.2)
GFR, ESTIMATED: 84 ML/MIN/1.73M2
GLUCOSE BLD-MCNC: 81 MG/DL (ref 74–99)
HCT VFR BLD CALC: 43.6 % (ref 37–54)
HEMOGLOBIN: 14.1 G/DL (ref 12.5–16.5)
MCH RBC QN AUTO: 29.4 PG (ref 26–35)
MCHC RBC AUTO-ENTMCNC: 32.3 G/DL (ref 32–34.5)
MCV RBC AUTO: 90.8 FL (ref 80–99.9)
PDW BLD-RTO: 12.9 % (ref 11.5–15)
PLATELET # BLD: 237 K/UL (ref 130–450)
PMV BLD AUTO: 9.8 FL (ref 7–12)
POTASSIUM SERPL-SCNC: 4.7 MMOL/L (ref 3.5–5)
PREALBUMIN: 23 MG/DL (ref 20–40)
RBC # BLD: 4.8 M/UL (ref 3.8–5.8)
SODIUM BLD-SCNC: 141 MMOL/L (ref 132–146)
TOTAL PROTEIN: 7 G/DL (ref 6.4–8.3)
VITAMIN B-12: 1270 PG/ML (ref 211–946)
VITAMIN D 25-HYDROXY: 80.7 NG/ML (ref 30–100)
WBC # BLD: 6.1 K/UL (ref 4.5–11.5)

## 2024-09-27 LAB
COPPER: 110.8 UG/DL (ref 70–140)
ZINC: 64.9 UG/DL (ref 60–120)

## 2024-09-29 LAB
RETINYL PALMITATE: 0.03 MG/L (ref 0–0.1)
VITAMIN A LEVEL: 0.65 MG/L (ref 0.3–1.2)
VITAMIN A, INTERP: NORMAL

## 2024-09-30 LAB — VITAMIN B1 WHOLE BLOOD: 173 NMOL/L (ref 70–180)

## 2024-10-08 ENCOUNTER — OFFICE VISIT (OUTPATIENT)
Dept: BARIATRICS/WEIGHT MGMT | Age: 64
End: 2024-10-08
Payer: COMMERCIAL

## 2024-10-08 VITALS
HEIGHT: 72 IN | WEIGHT: 238 LBS | DIASTOLIC BLOOD PRESSURE: 72 MMHG | BODY MASS INDEX: 32.23 KG/M2 | HEART RATE: 66 BPM | SYSTOLIC BLOOD PRESSURE: 118 MMHG

## 2024-10-08 DIAGNOSIS — L98.7 EXCESS SKIN: ICD-10-CM

## 2024-10-08 DIAGNOSIS — K91.2 MALNUTRITION FOLLOWING GASTROINTESTINAL SURGERY: Primary | ICD-10-CM

## 2024-10-08 DIAGNOSIS — L30.4 INTERTRIGO: ICD-10-CM

## 2024-10-08 PROCEDURE — 99211 OFF/OP EST MAY X REQ PHY/QHP: CPT

## 2024-10-08 PROCEDURE — 99213 OFFICE O/P EST LOW 20 MIN: CPT | Performed by: NURSE PRACTITIONER

## 2024-10-08 NOTE — PROGRESS NOTES
Sumeet Frazier  10/8/2024  [facility]    Lisa-en- Y Gastric Bypass  2 year Post-Operative Follow-up     Chief Complaint   Patient presents with    Bariatrics Post Op Follow Up     LRYGB 2 yr po. Water intake 32-48oz daily. Protein through food and shakes. Vitamins daily. Normal bm        Sumeet Frazier is a 64 y.o. male who is 2 years post Laparoscopic Lisa-en-Y Gastric Bypass surgery.  Reports that he is doing good. He is not having swallowing difficulty. Patient denies nausea and vomiting. Patient reports occasional gastric reflux symptoms, does take omeprazole daily. Bowel movements are  normal per patient. Patient is taking fiber supplements, which include fiber well gummies, 4 per day.     Patient is compliant most of the time with the vit c, vit d, MVI, calcium, iron. He is not meeting fluid recommendations of at least 64 ounces per day and is meeting protein recommendations, does still drink a chocolate fair life protein drink daily. He  is exercising:  gym 3 times per week, works on weights .     Patient is inquiring at this time about skin removal. He reports that he does get skin irritation under his pannus in the heat when he sweats. Explained to patient that it is recommended his BMI be below 30. Patient would like referral and will continue to work on weight loss.     Last Surgical Weight Loss:      10/16/2023    10:30 AM 4/3/2024     8:04 AM 10/8/2024     8:21 AM   Surgical Weight Loss Tracker   Consult Date 9/2/2022 9/2/2022    Initial Height 6' 0\" 6' 0\"    Initial Weight 315 lb 315 lb    Initial BMI 42.72 42.72    Ideal Body Weight 188 lb 188 lb    Surgery Date 10/18/2022 10/18/2022    Pre-Surgical Height 6' 0\" 6' 0\"    Pre-Surgical Weight 308 lb 308 lb    Pre Surgery BMI 41.77 41.77    Weight to Lose 120 lb 120 lb    Date 10/16/2023 4/3/2024 10/8/2024   Height 6' 0\" 6' 0\" 6' 0\"   Weight 243 lb 239 lb 238 lb   BMI 32.95 32.41 32.28   Weight Change -65 lb -4 lb -1 lb   Total

## 2024-10-08 NOTE — PATIENT INSTRUCTIONS
Please continue to take your vitamin and mineral supplements as instructed.      If you received a blood work prescription today for laboratory monitoring due prior to your next routine follow-up visit, please have this blood work obtained 10 to 14 days prior to your next visit.  It is important to fast for 12 hours prior to routine weight loss surgery blood work, EXCEPT for drinking water, to ensure accuracy of results.    Please report nausea, vomiting, abdominal pain, or any other problems you experience to your surgeon.  For problems related to weight loss surgery, it is best to go to [facility] Emergency Department and have your surgeon paged.    Last Surgical Weight Loss:      10/16/2023    10:30 AM 4/3/2024     8:04 AM 10/8/2024     8:21 AM   Surgical Weight Loss Tracker   Consult Date 9/2/2022 9/2/2022    Initial Height 6' 0\" 6' 0\"    Initial Weight 315 lb 315 lb    Initial BMI 42.72 42.72    Ideal Body Weight 188 lb 188 lb    Surgery Date 10/18/2022 10/18/2022    Pre-Surgical Height 6' 0\" 6' 0\"    Pre-Surgical Weight 308 lb 308 lb    Pre Surgery BMI 41.77 41.77    Weight to Lose 120 lb 120 lb    Date 10/16/2023 4/3/2024 10/8/2024   Height 6' 0\" 6' 0\" 6' 0\"   Weight 243 lb 239 lb 238 lb   BMI 32.95 32.41 32.28   Weight Change -65 lb -4 lb -1 lb   Total Weight Change -65 lb -69 lb -70 lb   % EBWL 54% 58% 58%

## 2024-10-09 ENCOUNTER — OFFICE VISIT (OUTPATIENT)
Dept: SURGERY | Age: 64
End: 2024-10-09
Payer: COMMERCIAL

## 2024-10-09 VITALS
DIASTOLIC BLOOD PRESSURE: 75 MMHG | SYSTOLIC BLOOD PRESSURE: 119 MMHG | TEMPERATURE: 98 F | HEIGHT: 72 IN | BODY MASS INDEX: 32.78 KG/M2 | WEIGHT: 242 LBS | HEART RATE: 94 BPM

## 2024-10-09 DIAGNOSIS — E65 ABDOMINAL PANNICULUS: Primary | ICD-10-CM

## 2024-10-09 PROCEDURE — 99203 OFFICE O/P NEW LOW 30 MIN: CPT | Performed by: PHYSICIAN ASSISTANT

## 2024-10-09 RX ORDER — NYSTATIN 100000 [USP'U]/G
POWDER TOPICAL
Qty: 65 G | Refills: 5 | Status: SHIPPED | OUTPATIENT
Start: 2024-10-09

## 2024-10-09 NOTE — PROGRESS NOTES
[facility]     Morbid obesity due to excess calories     Sarcoidosis     Sleep apnea treated with continuous positive airway pressure (CPAP)      Past Surgical History:    Past Surgical History:   Procedure Laterality Date    CARPAL TUNNEL RELEASE Bilateral     HERNIA REPAIR  08/2021    KNEE SURGERY Bilateral     x2 last 2/2015    RETINAL DETACHMENT SURGERY Right 5/6/2024    RIGHT EYE PARS PLANA VITRECTOMY ENDOLASER GAS FLUID EXCHANGE 25G performed by Serge Swift MD at [facility] OR    CATHI-EN-Y GASTRIC BYPASS N/A 10/18/2022    GASTRIC BYPASS CATHI-EN-Y LAPAROSCOPIC ROBOTIC XI performed by Rikki Lincoln MD at [facility] OR    TONSILLECTOMY      UPPER GASTROINTESTINAL ENDOSCOPY  04/29/2022     Current Medications:      Current Outpatient Medications   Medication Sig Dispense Refill    Cholecalciferol (VITAMIN D3) 125 MCG (5000 UT) TABS Take by mouth Twice a week      FLUoxetine (PROZAC) 40 MG capsule Take 1 capsule by mouth daily      omeprazole (PRILOSEC) 20 MG delayed release capsule Take 1 capsule by mouth Daily 30 capsule 12    ascorbic acid (VITAMIN C) 100 MG tablet Vitamin C Active April 25th, 2022 2:11pm      albuterol sulfate HFA (PROVENTIL;VENTOLIN;PROAIR) 108 (90 Base) MCG/ACT inhaler INHALE 2 PUFFS BY MOUTH EVERY 6 HOURS AS NEEDED FOR WHEEZING 18 g 0    EPINEPHrine (EPIPEN 2-SAYDA) 0.3 MG/0.3ML SOAJ injection Inject 0.3 mLs into the muscle once for 1 dose Use as directed for allergic reaction 0.3 mL 2    valACYclovir (VALTREX) 1 g tablet Take 1 tablet by mouth as needed      aspirin 81 MG tablet Take 1 tablet by mouth daily      levothyroxine (SYNTHROID) 150 MCG tablet Take 1 tablet by mouth Daily       No current facility-administered medications for this visit.       Allergies:  Provigil [modafinil]    Social History:   Social History     Socioeconomic History    Marital status:      Spouse name: Not on file    Number of children: Not on file    Years of education: Not on file    Highest

## 2024-11-03 DIAGNOSIS — K21.9 GASTROESOPHAGEAL REFLUX DISEASE WITHOUT ESOPHAGITIS: ICD-10-CM

## 2024-11-30 DIAGNOSIS — K21.9 GASTROESOPHAGEAL REFLUX DISEASE WITHOUT ESOPHAGITIS: ICD-10-CM

## 2024-12-30 DIAGNOSIS — K21.9 GASTROESOPHAGEAL REFLUX DISEASE WITHOUT ESOPHAGITIS: ICD-10-CM

## 2025-01-03 ENCOUNTER — TELEPHONE (OUTPATIENT)
Dept: BARIATRICS/WEIGHT MGMT | Age: 65
End: 2025-01-03

## 2025-01-03 NOTE — TELEPHONE ENCOUNTER
Sumeet has been experiencing more frequent heartburn in the past several weeks. He wanted to know if he needed to come in to see Remedios or if she could just prescribe something for him.

## 2025-01-07 DIAGNOSIS — K21.9 GASTROESOPHAGEAL REFLUX DISEASE WITHOUT ESOPHAGITIS: Primary | ICD-10-CM

## 2025-01-07 RX ORDER — SUCRALFATE 1 G/1
0.5 TABLET ORAL 4 TIMES DAILY
Qty: 60 TABLET | Refills: 1 | Status: SHIPPED | OUTPATIENT
Start: 2025-01-07 | End: 2025-03-08

## 2025-01-08 ENCOUNTER — TELEPHONE (OUTPATIENT)
Dept: BARIATRICS/WEIGHT MGMT | Age: 65
End: 2025-01-08

## 2025-01-08 NOTE — TELEPHONE ENCOUNTER
Prior authorization approved  Payer: [employer] Patient's Payer Case ID: [id number]    [phone]  Note from payer: CaseId:[id number];Status:Approved;Review Type:Qty;Coverage Start Date:12/08/2024;Coverage End Date:01/07/2026;  Approval Details    Authorized from December 8, 2024 to January 7, 2026

## 2025-03-02 DIAGNOSIS — K21.9 GASTROESOPHAGEAL REFLUX DISEASE WITHOUT ESOPHAGITIS: ICD-10-CM

## 2025-03-03 RX ORDER — SUCRALFATE 1 G/1
TABLET ORAL
Qty: 60 TABLET | Refills: 0 | Status: SHIPPED | OUTPATIENT
Start: 2025-03-03

## 2025-06-09 ENCOUNTER — OFFICE VISIT (OUTPATIENT)
Age: 65
End: 2025-06-09
Payer: MEDICARE

## 2025-06-09 VITALS
SYSTOLIC BLOOD PRESSURE: 112 MMHG | HEIGHT: 72 IN | BODY MASS INDEX: 30.88 KG/M2 | HEART RATE: 64 BPM | DIASTOLIC BLOOD PRESSURE: 74 MMHG | WEIGHT: 228 LBS

## 2025-06-09 DIAGNOSIS — K91.2 MALNUTRITION FOLLOWING GASTROINTESTINAL SURGERY: Primary | ICD-10-CM

## 2025-06-09 DIAGNOSIS — K21.9 GASTROESOPHAGEAL REFLUX DISEASE WITHOUT ESOPHAGITIS: ICD-10-CM

## 2025-06-09 DIAGNOSIS — E66.01 MORBID OBESITY DUE TO EXCESS CALORIES (HCC): ICD-10-CM

## 2025-06-09 PROCEDURE — 1124F ACP DISCUSS-NO DSCNMKR DOCD: CPT | Performed by: NURSE PRACTITIONER

## 2025-06-09 PROCEDURE — 99213 OFFICE O/P EST LOW 20 MIN: CPT | Performed by: NURSE PRACTITIONER

## 2025-06-09 RX ORDER — SUCRALFATE 1 G/1
0.5 TABLET ORAL 4 TIMES DAILY PRN
Qty: 60 TABLET | Refills: 1 | Status: SHIPPED | OUTPATIENT
Start: 2025-06-09 | End: 2025-12-06

## 2025-06-09 NOTE — PATIENT INSTRUCTIONS
Please continue to take your vitamin and mineral supplements as instructed.      If you received a blood work prescription today for laboratory monitoring due prior to your next routine follow-up visit, please have this blood work obtained 10 to 14 days prior to your next visit.  It is important to fast for 12 hours prior to routine weight loss surgery blood work, EXCEPT for drinking water, to ensure accuracy of results.    Please report nausea, vomiting, abdominal pain, or any other problems you experience to your surgeon.  For problems related to weight loss surgery, it is best to go to [facility] Emergency Department and have your surgeon paged.    Last Surgical Weight Loss:      6/9/2025    11:38 AM 10/8/2024     8:21 AM 4/3/2024     8:04 AM 10/16/2023    10:30 AM   Surgical Weight Loss Tracker   Date 9/2/2022 9/2/2022 9/2/2022   Visit Type  New Consult      Height 6' 0\"  6' 0\" 6' 0\"   Initial Weight 315 lb  315 lb 315 lb   Initial BMI 42.7  42.72 42.72   Ideal Body Weight 188 lb  188 lb 188 lb   Initial Excess Body Weight (EBW) 127 lb      Surgery Date 10/18/2022  10/18/2022 10/18/2022   Pre-Surgical Weight 308 lb  308 lb 308 lb   Pre Surgery BMI 41.8  41.77 41.77   Preop Weight Change 7 lb      Preop % Weight Change 2%      Pre-Surgical EBW 7 lb 8 oz      Date 6/9/2025 10/8/2024 4/3/2024 10/16/2023   Weight 228 lb 238 lb 239 lb 243 lb   BMI 30.9 32.28 32.41 32.95   Wt Change Since Last Visit -10 lb -1 lb -4 lb -65 lb   Total Wt Change Since Surgery -80 lb -70 lb -69 lb -65 lb   % EBWL 67% 58% 58% 54%   % Total Body Wt Loss 26%

## 2025-06-09 NOTE — PROGRESS NOTES
Chief Complaint   Patient presents with    Weight Management     Weight gain-pt states he's stuck in the same for a few months        HPI:  Patient presents today for discussion of weight loss. He reports that he feels like he is stuck where he is. He notes that he does still have restriction from his surgery. He reports that he has been eating more snack types of foods, more sweets than he should be. He is doing more yard work and home activities, no regular exercise.  He has been drinking water, decaf coffee, sweet tea.     Patient is 2 1/2 years s/p LRYGB.     Patient denies any personal or family history of thyroid cancer. Denies any history of pancreatitis.     Last Surgical Weight Loss:      6/9/2025    11:38 AM 10/8/2024     8:21 AM 4/3/2024     8:04 AM 10/16/2023    10:30 AM   Surgical Weight Loss Tracker   Date 9/2/2022 9/2/2022 9/2/2022   Visit Type  New Consult      Height 6' 0\"  6' 0\" 6' 0\"   Initial Weight 315 lb  315 lb 315 lb   Initial BMI 42.7  42.72 42.72   Ideal Body Weight 188 lb  188 lb 188 lb   Initial Excess Body Weight (EBW) 127 lb      Surgery Date 10/18/2022  10/18/2022 10/18/2022   Pre-Surgical Weight 308 lb  308 lb 308 lb   Pre Surgery BMI 41.8  41.77 41.77   Preop Weight Change 7 lb      Preop % Weight Change 2%      Pre-Surgical EBW 7 lb 8 oz      Date 6/9/2025 10/8/2024 4/3/2024 10/16/2023   Weight 228 lb 238 lb 239 lb 243 lb   BMI 30.9 32.28 32.41 32.95   Wt Change Since Last Visit -10 lb -1 lb -4 lb -65 lb   Total Wt Change Since Surgery -80 lb -70 lb -69 lb -65 lb   % EBWL 67% 58% 58% 54%   % Total Body Wt Loss 26%            Prior to Visit Medications    Medication Sig Taking? Authorizing Provider   sucralfate (CARAFATE) 1 GM tablet TAKE 1/2 (ONE-HALF) TABLET BY MOUTH 4 TIMES DAILY Yes Remedios Cash APRN - CNP   omeprazole (PRILOSEC) 20 MG delayed release capsule Take 1 capsule by mouth 2 times daily (before meals) Yes Remedios Cash, APRN - CNP   nystatin (MYCOSTATIN)

## 2025-07-03 DIAGNOSIS — E66.01 MORBID OBESITY DUE TO EXCESS CALORIES (HCC): ICD-10-CM

## 2025-07-03 DIAGNOSIS — K91.2 MALNUTRITION FOLLOWING GASTROINTESTINAL SURGERY: ICD-10-CM

## 2025-07-20 DIAGNOSIS — K21.9 GASTROESOPHAGEAL REFLUX DISEASE WITHOUT ESOPHAGITIS: ICD-10-CM

## 2025-07-21 RX ORDER — SUCRALFATE 1 G/1
TABLET ORAL
Qty: 60 TABLET | Refills: 1 | Status: SHIPPED | OUTPATIENT
Start: 2025-07-21

## (undated) DEVICE — TUBING, SUCTION, 1/4" X 10', STRAIGHT: Brand: MEDLINE

## (undated) DEVICE — TOWEL,OR,DSP,ST,BLUE,STD,6/PK,12PK/CS: Brand: MEDLINE

## (undated) DEVICE — GOWN,SIRUS,NONRNF,SETINSLV,XL,20/CS: Brand: MEDLINE

## (undated) DEVICE — COVER,LIGHT HANDLE,FLX,1/PK: Brand: MEDLINE INDUSTRIES, INC.

## (undated) DEVICE — PUMP SUC IRR TBNG L10FT W/ HNDPC ASSEMB STRYKEFLOW 2

## (undated) DEVICE — BLADELESS OBTURATOR: Brand: WECK VISTA

## (undated) DEVICE — FILTER HI FLO MICROBIOLOGICAL CONTAMINATION 0.20U 0.22U STRL

## (undated) DEVICE — REDUCER: Brand: ENDOWRIST

## (undated) DEVICE — NEEDLE SPNL 22GA L3.5IN BLK HUB S STL REG WALL FIT STYL W/

## (undated) DEVICE — Device: Brand: INSTRUSAFE

## (undated) DEVICE — CANNULA SEAL

## (undated) DEVICE — SYRINGE, LUER LOCK, 10ML: Brand: MEDLINE

## (undated) DEVICE — SYRINGE 20ML LL S/C 50

## (undated) DEVICE — ELECTRO LUBE IS A SINGLE PATIENT USE DEVICE THAT IS INTENDED TO BE USED ON ELECTROSURGICAL ELECTRODES TO REDUCE STICKING.: Brand: KEY SURGICAL ELECTRO LUBE

## (undated) DEVICE — VESSEL SEALER EXTEND: Brand: ENDOWRIST

## (undated) DEVICE — MEGA SUTURECUT ND: Brand: ENDOWRIST

## (undated) DEVICE — STOPCOCK IV PRIMING 0.26ML 3 W W/ TWO FEM LUERLOK PRT 1

## (undated) DEVICE — AIRLIFE™ ADULT OXYGEN MASK VINYL, UNDER THE CHIN STYLE, 3 IN 1 MASK WITH SAFETY VENT, WITH 7 FEET (2.1 M) CRUSH RESISTANT OXYGEN TUBING: Brand: AIRLIFE™

## (undated) DEVICE — CADIERE FORCEPS: Brand: ENDOWRIST

## (undated) DEVICE — SEAL

## (undated) DEVICE — SHEET DRAPE FULL 70X100

## (undated) DEVICE — PACK SURG LAP CHOLE CUSTOM

## (undated) DEVICE — SUTURE V-LOC 180 SZ 0 L9IN ABSRB GRN GS-21 L37MM 1/2 CIR VLOCL0346

## (undated) DEVICE — TIP-UP FENESTRATED GRASPER: Brand: ENDOWRIST

## (undated) DEVICE — APPLICATOR MEDICATED 26 CC SOLUTION HI LT ORNG CHLORAPREP

## (undated) DEVICE — GLOVE ORANGE PI 7 1/2   MSG9075

## (undated) DEVICE — GLOVE SURG SZ 65 THK91MIL LTX FREE SYN POLYISOPRENE

## (undated) DEVICE — PITCHER PT 1200ML W HNDL CSR WRP

## (undated) DEVICE — STAPLER 60 RELOAD BLUE: Brand: SUREFORM

## (undated) DEVICE — NDL CNTR 40CT FM MAG: Brand: MEDLINE INDUSTRIES, INC.

## (undated) DEVICE — BLOCK BITE 60FR CAREGUARD

## (undated) DEVICE — SHIELD EYE W3XL2.5IN UNIV CLR PLAS LTWT

## (undated) DEVICE — MARKER,SKIN,WI/RULER AND LABELS: Brand: MEDLINE

## (undated) DEVICE — NEEDLE HYPO 25GA L1.5IN BLU POLYPR HUB S STL REG BVL STR

## (undated) DEVICE — STAPLER 60: Brand: SUREFORM

## (undated) DEVICE — KIT BEDSIDE REVITAL OX 500ML

## (undated) DEVICE — PACK PROCEDURE SURG RETINAL ST ES CUST

## (undated) DEVICE — ISPAN C3F8 PERFLUOROPROPANEISPAN* C3F8 IS A FLUORINATED GREENHOUSE GAS COVERED BY THE KYOTO PROTOCOL AND HAS A GLOBAL WARMING POTENTIAL (GWP) OF 8,600. RESIDUAL ISPAN* C3F8 GAS SHOULD BE RECOVERED BY APPROPRIATELY QUALIFIED PERSONNEL AND RECYCLED, RECLAIMED OR DESTROYED IN ACCORDANCE WITH LOCAL ORDINANCES.: Brand: ISPAN

## (undated) DEVICE — MICRO TIP WIPE: Brand: DEVON

## (undated) DEVICE — SYRINGE MED 50ML LUERLOCK TIP

## (undated) DEVICE — GARMENT,MEDLINE,DVT,INT,CALF,MED, GEN2: Brand: MEDLINE

## (undated) DEVICE — STAPLER 60 RELOAD WHITE: Brand: SUREFORM

## (undated) DEVICE — SUTURE ABSRB L6IN L37MM 0 GS-21 GRN 1/2 CIR TAPR PNT NDL VLOCL0306

## (undated) DEVICE — 1060 S-DRAPE SPCL INCISE 10/BX 4BX/CS: Brand: STERI-DRAPE™

## (undated) DEVICE — [HIGH FLOW INSUFFLATOR,  DO NOT USE IF PACKAGE IS DAMAGED,  KEEP DRY,  KEEP AWAY FROM SUNLIGHT,  PROTECT FROM HEAT AND RADIOACTIVE SOURCES.]: Brand: PNEUMOSURE

## (undated) DEVICE — COLUMN DRAPE

## (undated) DEVICE — WARMER SCP LAP

## (undated) DEVICE — CLEANER LENS C-CLEAR

## (undated) DEVICE — NEEDLE SYR 18GA L1.5IN RED PLAS HUB S STL BLNT FILL W/O

## (undated) DEVICE — ARM DRAPE

## (undated) DEVICE — NEEDLE LAP VERES 14 GAX120 MM IN124] THE OR COMPANY]

## (undated) DEVICE — SOLUTION IRRIG 1000ML 0.9% SOD CHL USP POUR PLAS BTL

## (undated) DEVICE — GLOVE SURG SZ 55 CRM LTX FREE POLYISOPRENE POLYMER BEAD ANTI

## (undated) DEVICE — ELECTRODE PT RET AD L9FT HI MOIST COND ADH HYDRGEL CORDED

## (undated) DEVICE — DOUBLE BASIN SET: Brand: MEDLINE INDUSTRIES, INC.

## (undated) DEVICE — VITRECTOMY PACK 25 GA INPUT